# Patient Record
Sex: FEMALE | Race: WHITE | NOT HISPANIC OR LATINO | Employment: UNEMPLOYED | URBAN - METROPOLITAN AREA
[De-identification: names, ages, dates, MRNs, and addresses within clinical notes are randomized per-mention and may not be internally consistent; named-entity substitution may affect disease eponyms.]

---

## 2018-07-09 ENCOUNTER — OFFICE VISIT - HEALTHEAST (OUTPATIENT)
Dept: PODIATRY | Facility: CLINIC | Age: 58
End: 2018-07-09

## 2018-07-09 ENCOUNTER — RECORDS - HEALTHEAST (OUTPATIENT)
Dept: GENERAL RADIOLOGY | Facility: CLINIC | Age: 58
End: 2018-07-09

## 2018-07-09 DIAGNOSIS — M20.41 HAMMER TOES OF BOTH FEET: ICD-10-CM

## 2018-07-09 DIAGNOSIS — M20.42 HAMMER TOES OF BOTH FEET: ICD-10-CM

## 2018-07-09 DIAGNOSIS — M21.6X1 PRONATION DEFORMITY OF BOTH FEET: ICD-10-CM

## 2018-07-09 DIAGNOSIS — M20.42 OTHER HAMMER TOE(S) (ACQUIRED), LEFT FOOT: ICD-10-CM

## 2018-07-09 DIAGNOSIS — M20.41 OTHER HAMMER TOE(S) (ACQUIRED), RIGHT FOOT: ICD-10-CM

## 2018-07-09 DIAGNOSIS — M21.6X2 PRONATION DEFORMITY OF BOTH FEET: ICD-10-CM

## 2018-07-12 ENCOUNTER — OFFICE VISIT - HEALTHEAST (OUTPATIENT)
Dept: INTERNAL MEDICINE | Facility: CLINIC | Age: 58
End: 2018-07-12

## 2018-07-12 DIAGNOSIS — M20.41 HAMMER TOES OF BOTH FEET: ICD-10-CM

## 2018-07-12 DIAGNOSIS — F41.9 ANXIETY: ICD-10-CM

## 2018-07-12 DIAGNOSIS — Z01.818 PREOP GENERAL PHYSICAL EXAM: ICD-10-CM

## 2018-07-12 DIAGNOSIS — M20.42 HAMMER TOES OF BOTH FEET: ICD-10-CM

## 2018-07-12 LAB
ATRIAL RATE - MUSE: 66 BPM
DIASTOLIC BLOOD PRESSURE - MUSE: NORMAL MMHG
HGB BLD-MCNC: 13.1 G/DL (ref 12–16)
INTERPRETATION ECG - MUSE: NORMAL
P AXIS - MUSE: 23 DEGREES
PR INTERVAL - MUSE: 196 MS
QRS DURATION - MUSE: 102 MS
QT - MUSE: 410 MS
QTC - MUSE: 429 MS
R AXIS - MUSE: 0 DEGREES
SYSTOLIC BLOOD PRESSURE - MUSE: NORMAL MMHG
T AXIS - MUSE: 6 DEGREES
VENTRICULAR RATE- MUSE: 66 BPM

## 2018-07-12 ASSESSMENT — MIFFLIN-ST. JEOR: SCORE: 1610.15

## 2018-07-23 ENCOUNTER — ANESTHESIA - HEALTHEAST (OUTPATIENT)
Dept: SURGERY | Facility: AMBULATORY SURGERY CENTER | Age: 58
End: 2018-07-23

## 2018-07-23 ENCOUNTER — COMMUNICATION - HEALTHEAST (OUTPATIENT)
Dept: INTERNAL MEDICINE | Facility: CLINIC | Age: 58
End: 2018-07-23

## 2018-07-23 ASSESSMENT — MIFFLIN-ST. JEOR: SCORE: 1606.52

## 2018-07-24 ENCOUNTER — SURGERY - HEALTHEAST (OUTPATIENT)
Dept: SURGERY | Facility: AMBULATORY SURGERY CENTER | Age: 58
End: 2018-07-24

## 2018-07-24 ASSESSMENT — MIFFLIN-ST. JEOR: SCORE: 1606.52

## 2018-08-01 ENCOUNTER — OFFICE VISIT - HEALTHEAST (OUTPATIENT)
Dept: PODIATRY | Facility: CLINIC | Age: 58
End: 2018-08-01

## 2018-08-01 DIAGNOSIS — M20.42 HAMMER TOES OF BOTH FEET: ICD-10-CM

## 2018-08-01 DIAGNOSIS — M20.41 HAMMER TOES OF BOTH FEET: ICD-10-CM

## 2018-08-08 ENCOUNTER — RECORDS - HEALTHEAST (OUTPATIENT)
Dept: GENERAL RADIOLOGY | Facility: CLINIC | Age: 58
End: 2018-08-08

## 2018-08-08 ENCOUNTER — OFFICE VISIT - HEALTHEAST (OUTPATIENT)
Dept: PODIATRY | Facility: CLINIC | Age: 58
End: 2018-08-08

## 2018-08-08 DIAGNOSIS — M20.42 OTHER HAMMER TOE(S) (ACQUIRED), LEFT FOOT: ICD-10-CM

## 2018-08-08 DIAGNOSIS — M20.42 HAMMER TOES OF BOTH FEET: ICD-10-CM

## 2018-08-08 DIAGNOSIS — M20.41 OTHER HAMMER TOE(S) (ACQUIRED), RIGHT FOOT: ICD-10-CM

## 2018-08-08 DIAGNOSIS — M20.41 HAMMER TOES OF BOTH FEET: ICD-10-CM

## 2018-08-15 ENCOUNTER — OFFICE VISIT - HEALTHEAST (OUTPATIENT)
Dept: PODIATRY | Facility: CLINIC | Age: 58
End: 2018-08-15

## 2018-08-15 ENCOUNTER — AMBULATORY - HEALTHEAST (OUTPATIENT)
Dept: OTHER | Facility: CLINIC | Age: 58
End: 2018-08-15

## 2018-08-15 DIAGNOSIS — M20.41 HAMMER TOES OF BOTH FEET: ICD-10-CM

## 2018-08-15 DIAGNOSIS — M21.6X2 PRONATION DEFORMITY OF BOTH FEET: ICD-10-CM

## 2018-08-15 DIAGNOSIS — M20.42 HAMMER TOES OF BOTH FEET: ICD-10-CM

## 2018-08-15 DIAGNOSIS — M21.6X1 PRONATION DEFORMITY OF BOTH FEET: ICD-10-CM

## 2018-08-29 ENCOUNTER — AMBULATORY - HEALTHEAST (OUTPATIENT)
Dept: OTHER | Facility: CLINIC | Age: 58
End: 2018-08-29

## 2018-12-31 ENCOUNTER — COMMUNICATION - HEALTHEAST (OUTPATIENT)
Dept: ADMINISTRATIVE | Facility: CLINIC | Age: 58
End: 2018-12-31

## 2019-04-05 ENCOUNTER — OFFICE VISIT - HEALTHEAST (OUTPATIENT)
Dept: INTERNAL MEDICINE | Facility: CLINIC | Age: 59
End: 2019-04-05

## 2019-04-05 DIAGNOSIS — J45.990 EXERCISE-INDUCED ASTHMA: ICD-10-CM

## 2019-04-05 DIAGNOSIS — L90.0 LICHEN SCLEROSUS: ICD-10-CM

## 2019-04-05 DIAGNOSIS — Z00.00 ROUTINE GENERAL MEDICAL EXAMINATION AT A HEALTH CARE FACILITY: ICD-10-CM

## 2019-04-05 DIAGNOSIS — N39.3 FEMALE STRESS INCONTINENCE: ICD-10-CM

## 2019-04-05 DIAGNOSIS — B00.9 HSV-1 (HERPES SIMPLEX VIRUS 1) INFECTION: ICD-10-CM

## 2019-04-05 LAB
CHOLEST SERPL-MCNC: 281 MG/DL
FASTING STATUS PATIENT QL REPORTED: YES
FASTING STATUS PATIENT QL REPORTED: YES
GLUCOSE BLD-MCNC: 89 MG/DL (ref 70–125)
HDLC SERPL-MCNC: 58 MG/DL
LDLC SERPL CALC-MCNC: 199 MG/DL
TRIGL SERPL-MCNC: 122 MG/DL

## 2019-04-05 ASSESSMENT — MIFFLIN-ST. JEOR: SCORE: 1422.92

## 2019-04-16 ENCOUNTER — OFFICE VISIT - HEALTHEAST (OUTPATIENT)
Dept: INTERNAL MEDICINE | Facility: CLINIC | Age: 59
End: 2019-04-16

## 2019-04-16 DIAGNOSIS — Z12.31 ENCOUNTER FOR SCREENING MAMMOGRAM FOR BREAST CANCER: ICD-10-CM

## 2019-04-16 DIAGNOSIS — H93.8X1 EAR FULLNESS, RIGHT: ICD-10-CM

## 2019-04-16 DIAGNOSIS — R21 SKIN RASH: ICD-10-CM

## 2019-04-16 ASSESSMENT — MIFFLIN-ST. JEOR: SCORE: 1429.73

## 2019-04-22 ENCOUNTER — HOSPITAL ENCOUNTER (OUTPATIENT)
Dept: MAMMOGRAPHY | Facility: CLINIC | Age: 59
Discharge: HOME | End: 2019-04-22

## 2019-04-22 DIAGNOSIS — Z12.31 ENCOUNTER FOR SCREENING MAMMOGRAM FOR BREAST CANCER: ICD-10-CM

## 2019-04-24 ENCOUNTER — RECORDS - HEALTHEAST (OUTPATIENT)
Dept: ADMINISTRATIVE | Facility: OTHER | Age: 59
End: 2019-04-24

## 2019-05-14 ENCOUNTER — COMMUNICATION - HEALTHEAST (OUTPATIENT)
Dept: NURSING | Facility: CLINIC | Age: 59
End: 2019-05-14

## 2019-06-05 ENCOUNTER — RECORDS - HEALTHEAST (OUTPATIENT)
Dept: ADMINISTRATIVE | Facility: OTHER | Age: 59
End: 2019-06-05

## 2019-07-22 ENCOUNTER — COMMUNICATION - HEALTHEAST (OUTPATIENT)
Dept: INTERNAL MEDICINE | Facility: CLINIC | Age: 59
End: 2019-07-22

## 2019-07-22 DIAGNOSIS — L90.0 LICHEN SCLEROSUS: ICD-10-CM

## 2019-12-06 ENCOUNTER — RECORDS - HEALTHEAST (OUTPATIENT)
Dept: ADMINISTRATIVE | Facility: OTHER | Age: 59
End: 2019-12-06

## 2021-02-07 ENCOUNTER — APPOINTMENT (OUTPATIENT)
Dept: MRI IMAGING | Facility: CLINIC | Age: 61
DRG: 069 | End: 2021-02-07
Attending: INTERNAL MEDICINE
Payer: COMMERCIAL

## 2021-02-07 ENCOUNTER — HOSPITAL ENCOUNTER (INPATIENT)
Facility: CLINIC | Age: 61
LOS: 1 days | Discharge: HOME | DRG: 069 | End: 2021-02-10
Attending: HOSPITALIST | Admitting: HOSPITALIST
Payer: COMMERCIAL

## 2021-02-07 DIAGNOSIS — G45.9 TIA (TRANSIENT ISCHEMIC ATTACK): Primary | ICD-10-CM

## 2021-02-07 LAB
CHOLEST SERPL-MCNC: 300 MG/DL
HBA1C MFR BLD: 5.4 % (ref 0–5.6)
HDLC SERPL-MCNC: 54 MG/DL
LDLC SERPL CALC-MCNC: ABNORMAL MG/DL
LDLC SERPL DIRECT ASSAY-MCNC: 188 MG/DL
NONHDLC SERPL-MCNC: 246 MG/DL
TRIGL SERPL-MCNC: 423 MG/DL
TROPONIN I SERPL-MCNC: <0.015 UG/L (ref 0–0.04)

## 2021-02-07 PROCEDURE — 99220 PR INITIAL OBSERVATION CARE,LEVEL III: CPT | Mod: AI | Performed by: INTERNAL MEDICINE

## 2021-02-07 PROCEDURE — 80061 LIPID PANEL: CPT | Performed by: INTERNAL MEDICINE

## 2021-02-07 PROCEDURE — A9585 GADOBUTROL INJECTION: HCPCS | Performed by: HOSPITALIST

## 2021-02-07 PROCEDURE — 70553 MRI BRAIN STEM W/O & W/DYE: CPT

## 2021-02-07 PROCEDURE — 83721 ASSAY OF BLOOD LIPOPROTEIN: CPT | Performed by: INTERNAL MEDICINE

## 2021-02-07 PROCEDURE — 99207 PR CDG-CODE CATEGORY CHANGED: CPT | Performed by: INTERNAL MEDICINE

## 2021-02-07 PROCEDURE — 255N000002 HC RX 255 OP 636: Performed by: HOSPITALIST

## 2021-02-07 PROCEDURE — 250N000013 HC RX MED GY IP 250 OP 250 PS 637: Performed by: INTERNAL MEDICINE

## 2021-02-07 PROCEDURE — G0378 HOSPITAL OBSERVATION PER HR: HCPCS

## 2021-02-07 PROCEDURE — 84484 ASSAY OF TROPONIN QUANT: CPT | Performed by: INTERNAL MEDICINE

## 2021-02-07 PROCEDURE — 36415 COLL VENOUS BLD VENIPUNCTURE: CPT | Performed by: INTERNAL MEDICINE

## 2021-02-07 PROCEDURE — 83036 HEMOGLOBIN GLYCOSYLATED A1C: CPT | Performed by: INTERNAL MEDICINE

## 2021-02-07 RX ORDER — ONDANSETRON 2 MG/ML
4 INJECTION INTRAMUSCULAR; INTRAVENOUS EVERY 6 HOURS PRN
Status: DISCONTINUED | OUTPATIENT
Start: 2021-02-07 | End: 2021-02-10 | Stop reason: HOSPADM

## 2021-02-07 RX ORDER — ONDANSETRON 4 MG/1
4 TABLET, ORALLY DISINTEGRATING ORAL EVERY 6 HOURS PRN
Status: DISCONTINUED | OUTPATIENT
Start: 2021-02-07 | End: 2021-02-10 | Stop reason: HOSPADM

## 2021-02-07 RX ORDER — LABETALOL HYDROCHLORIDE 5 MG/ML
10-40 INJECTION, SOLUTION INTRAVENOUS EVERY 10 MIN PRN
Status: DISCONTINUED | OUTPATIENT
Start: 2021-02-07 | End: 2021-02-10 | Stop reason: HOSPADM

## 2021-02-07 RX ORDER — TRIAMCINOLONE ACETONIDE 1 MG/G
CREAM TOPICAL 2 TIMES DAILY PRN
Status: ON HOLD | COMMUNITY
End: 2021-07-14

## 2021-02-07 RX ORDER — AMOXICILLIN 250 MG
1 CAPSULE ORAL 2 TIMES DAILY PRN
Status: DISCONTINUED | OUTPATIENT
Start: 2021-02-07 | End: 2021-02-10 | Stop reason: HOSPADM

## 2021-02-07 RX ORDER — ALBUTEROL SULFATE 90 UG/1
2 AEROSOL, METERED RESPIRATORY (INHALATION) EVERY 6 HOURS PRN
Status: ON HOLD | COMMUNITY
End: 2021-07-14

## 2021-02-07 RX ORDER — LACTOBACILLUS ACIDOPHILUS 500MM CELL
3 CAPSULE ORAL AT BEDTIME
COMMUNITY

## 2021-02-07 RX ORDER — ATORVASTATIN CALCIUM 40 MG/1
40 TABLET, FILM COATED ORAL
Status: DISCONTINUED | OUTPATIENT
Start: 2021-02-07 | End: 2021-02-08

## 2021-02-07 RX ORDER — AMOXICILLIN 250 MG
2 CAPSULE ORAL 2 TIMES DAILY PRN
Status: DISCONTINUED | OUTPATIENT
Start: 2021-02-07 | End: 2021-02-10 | Stop reason: HOSPADM

## 2021-02-07 RX ORDER — POLYETHYLENE GLYCOL 3350 17 G/17G
17 POWDER, FOR SOLUTION ORAL DAILY PRN
Status: DISCONTINUED | OUTPATIENT
Start: 2021-02-07 | End: 2021-02-10 | Stop reason: HOSPADM

## 2021-02-07 RX ORDER — CLOBETASOL PROPIONATE 0.5 MG/G
CREAM TOPICAL 2 TIMES DAILY PRN
COMMUNITY

## 2021-02-07 RX ORDER — BISACODYL 10 MG
10 SUPPOSITORY, RECTAL RECTAL DAILY PRN
Status: DISCONTINUED | OUTPATIENT
Start: 2021-02-07 | End: 2021-02-10 | Stop reason: HOSPADM

## 2021-02-07 RX ORDER — ACETAMINOPHEN 650 MG/1
650 SUPPOSITORY RECTAL EVERY 4 HOURS PRN
Status: DISCONTINUED | OUTPATIENT
Start: 2021-02-07 | End: 2021-02-10 | Stop reason: HOSPADM

## 2021-02-07 RX ORDER — GADOBUTROL 604.72 MG/ML
10 INJECTION INTRAVENOUS ONCE
Status: DISCONTINUED | OUTPATIENT
Start: 2021-02-07 | End: 2021-02-07

## 2021-02-07 RX ORDER — MULTIVIT WITH MINERALS/LUTEIN
250 TABLET ORAL DAILY
COMMUNITY

## 2021-02-07 RX ORDER — ACETAMINOPHEN 325 MG/1
650 TABLET ORAL EVERY 4 HOURS PRN
Status: DISCONTINUED | OUTPATIENT
Start: 2021-02-07 | End: 2021-02-10 | Stop reason: HOSPADM

## 2021-02-07 RX ORDER — HYDRALAZINE HYDROCHLORIDE 20 MG/ML
10-20 INJECTION INTRAMUSCULAR; INTRAVENOUS
Status: DISCONTINUED | OUTPATIENT
Start: 2021-02-07 | End: 2021-02-10 | Stop reason: HOSPADM

## 2021-02-07 RX ADMIN — GADOBUTROL 10 ML: 604.72 INJECTION INTRAVENOUS at 21:35

## 2021-02-07 RX ADMIN — ACETAMINOPHEN 650 MG: 325 TABLET, FILM COATED ORAL at 22:27

## 2021-02-07 ASSESSMENT — COLUMBIA-SUICIDE SEVERITY RATING SCALE - C-SSRS
1. IN THE PAST MONTH, HAVE YOU WISHED YOU WERE DEAD OR WISHED YOU COULD GO TO SLEEP AND NOT WAKE UP?: NO
6. HAVE YOU EVER DONE ANYTHING, STARTED TO DO ANYTHING, OR PREPARED TO DO ANYTHING TO END YOUR LIFE?: NO
2. HAVE YOU ACTUALLY HAD ANY THOUGHTS OF KILLING YOURSELF IN THE PAST MONTH?: NO
2. HAVE YOU ACTUALLY HAD ANY THOUGHTS OF KILLING YOURSELF SINCE LAST CONTACT?: NO

## 2021-02-08 ENCOUNTER — COMMUNICATION - HEALTHEAST (OUTPATIENT)
Dept: SCHEDULING | Facility: CLINIC | Age: 61
End: 2021-02-08

## 2021-02-08 ENCOUNTER — APPOINTMENT (OUTPATIENT)
Dept: MRI IMAGING | Facility: CLINIC | Age: 61
DRG: 069 | End: 2021-02-08
Attending: PSYCHIATRY & NEUROLOGY
Payer: COMMERCIAL

## 2021-02-08 ENCOUNTER — APPOINTMENT (OUTPATIENT)
Dept: CARDIOLOGY | Facility: CLINIC | Age: 61
DRG: 069 | End: 2021-02-08
Attending: INTERNAL MEDICINE
Payer: COMMERCIAL

## 2021-02-08 ENCOUNTER — APPOINTMENT (OUTPATIENT)
Dept: MRI IMAGING | Facility: CLINIC | Age: 61
DRG: 069 | End: 2021-02-08
Attending: STUDENT IN AN ORGANIZED HEALTH CARE EDUCATION/TRAINING PROGRAM
Payer: COMMERCIAL

## 2021-02-08 ENCOUNTER — APPOINTMENT (OUTPATIENT)
Dept: OCCUPATIONAL THERAPY | Facility: CLINIC | Age: 61
DRG: 069 | End: 2021-02-08
Attending: INTERNAL MEDICINE
Payer: COMMERCIAL

## 2021-02-08 LAB
GLUCOSE BLDC GLUCOMTR-MCNC: 105 MG/DL (ref 70–99)
GLUCOSE BLDC GLUCOMTR-MCNC: 94 MG/DL (ref 70–99)
TROPONIN I SERPL-MCNC: <0.015 UG/L (ref 0–0.04)

## 2021-02-08 PROCEDURE — 36415 COLL VENOUS BLD VENIPUNCTURE: CPT | Performed by: INTERNAL MEDICINE

## 2021-02-08 PROCEDURE — 250N000013 HC RX MED GY IP 250 OP 250 PS 637: Performed by: PHYSICIAN ASSISTANT

## 2021-02-08 PROCEDURE — 84484 ASSAY OF TROPONIN QUANT: CPT | Performed by: INTERNAL MEDICINE

## 2021-02-08 PROCEDURE — 999N001017 HC STATISTIC GLUCOSE BY METER IP

## 2021-02-08 PROCEDURE — 97165 OT EVAL LOW COMPLEX 30 MIN: CPT | Mod: GO

## 2021-02-08 PROCEDURE — 99254 IP/OBS CNSLTJ NEW/EST MOD 60: CPT | Mod: GC | Performed by: PSYCHIATRY & NEUROLOGY

## 2021-02-08 PROCEDURE — G0378 HOSPITAL OBSERVATION PER HR: HCPCS

## 2021-02-08 PROCEDURE — A9585 GADOBUTROL INJECTION: HCPCS | Performed by: HOSPITALIST

## 2021-02-08 PROCEDURE — 93306 TTE W/DOPPLER COMPLETE: CPT | Mod: 26 | Performed by: INTERNAL MEDICINE

## 2021-02-08 PROCEDURE — 250N000013 HC RX MED GY IP 250 OP 250 PS 637: Performed by: HOSPITALIST

## 2021-02-08 PROCEDURE — 70553 MRI BRAIN STEM W/O & W/DYE: CPT

## 2021-02-08 PROCEDURE — 999N000208 ECHOCARDIOGRAM COMPLETE

## 2021-02-08 PROCEDURE — 99226 PR SUBSEQUENT OBSERVATION CARE,LEVEL III: CPT | Performed by: HOSPITALIST

## 2021-02-08 PROCEDURE — 70544 MR ANGIOGRAPHY HEAD W/O DYE: CPT

## 2021-02-08 PROCEDURE — 255N000002 HC RX 255 OP 636: Performed by: HOSPITALIST

## 2021-02-08 PROCEDURE — 250N000013 HC RX MED GY IP 250 OP 250 PS 637: Performed by: INTERNAL MEDICINE

## 2021-02-08 PROCEDURE — 999N000226 HC STATISTIC SLP IP EVAL DEFER

## 2021-02-08 RX ORDER — LISINOPRIL 10 MG/1
10 TABLET ORAL DAILY
Qty: 30 TABLET | Refills: 0 | Status: SHIPPED | OUTPATIENT
Start: 2021-02-08 | End: 2022-04-29

## 2021-02-08 RX ORDER — ASPIRIN 81 MG/1
81 TABLET ORAL DAILY
Status: DISCONTINUED | OUTPATIENT
Start: 2021-02-09 | End: 2021-02-10 | Stop reason: HOSPADM

## 2021-02-08 RX ORDER — EZETIMIBE 10 MG/1
10 TABLET ORAL AT BEDTIME
Status: DISCONTINUED | OUTPATIENT
Start: 2021-02-08 | End: 2021-02-10 | Stop reason: HOSPADM

## 2021-02-08 RX ORDER — LISINOPRIL 10 MG/1
10 TABLET ORAL DAILY
Status: DISCONTINUED | OUTPATIENT
Start: 2021-02-08 | End: 2021-02-10 | Stop reason: HOSPADM

## 2021-02-08 RX ORDER — LISINOPRIL 2.5 MG/1
2.5 TABLET ORAL DAILY
Status: DISCONTINUED | OUTPATIENT
Start: 2021-02-08 | End: 2021-02-08

## 2021-02-08 RX ORDER — CLOPIDOGREL BISULFATE 75 MG/1
75 TABLET ORAL DAILY
Qty: 90 TABLET | Refills: 0 | Status: SHIPPED | OUTPATIENT
Start: 2021-02-08 | End: 2021-07-12

## 2021-02-08 RX ORDER — GADOBUTROL 604.72 MG/ML
10 INJECTION INTRAVENOUS ONCE
Status: COMPLETED | OUTPATIENT
Start: 2021-02-08 | End: 2021-02-08

## 2021-02-08 RX ORDER — EZETIMIBE 10 MG/1
10 TABLET ORAL AT BEDTIME
Qty: 30 TABLET | Refills: 0 | Status: SHIPPED | OUTPATIENT
Start: 2021-02-08 | End: 2021-07-12

## 2021-02-08 RX ORDER — CLOPIDOGREL BISULFATE 75 MG/1
75 TABLET ORAL DAILY
Status: DISCONTINUED | OUTPATIENT
Start: 2021-02-08 | End: 2021-02-09

## 2021-02-08 RX ADMIN — ACETAMINOPHEN 650 MG: 325 TABLET, FILM COATED ORAL at 23:38

## 2021-02-08 RX ADMIN — EZETIMIBE 10 MG: 10 TABLET ORAL at 22:28

## 2021-02-08 RX ADMIN — ACETAMINOPHEN 650 MG: 325 TABLET, FILM COATED ORAL at 19:59

## 2021-02-08 RX ADMIN — ACETAMINOPHEN 650 MG: 325 TABLET, FILM COATED ORAL at 08:09

## 2021-02-08 RX ADMIN — ACETAMINOPHEN 650 MG: 325 TABLET, FILM COATED ORAL at 11:56

## 2021-02-08 RX ADMIN — Medication 1 MG: at 23:38

## 2021-02-08 RX ADMIN — ASPIRIN 325 MG: 325 TABLET, COATED ORAL at 08:09

## 2021-02-08 RX ADMIN — ACETAMINOPHEN 650 MG: 325 TABLET, FILM COATED ORAL at 16:27

## 2021-02-08 RX ADMIN — GADOBUTROL 10 ML: 604.72 INJECTION INTRAVENOUS at 20:58

## 2021-02-08 RX ADMIN — LISINOPRIL 10 MG: 10 TABLET ORAL at 17:38

## 2021-02-08 NOTE — PHARMACY-ADMISSION MEDICATION HISTORY
Pharmacy Medication History  Admission medication history interview status for the 2/7/2021  admission is complete. See EPIC admission navigator for prior to admission medications     Location of Interview: Outside patient room but on unit  Medication history sources: Care Everywhere and [facility] completed PTA med list 2/7 at outside hospital prior to transfer to [facility]     In the past week, patient estimated taking medication greater than 90% of the time.    Medication reconciliation completed by provider prior to medication history? No    Time spent in this activity: 10 min      Prior to Admission medications    Medication Sig Last Dose Taking? Auth Provider   Acidophilus Lactobacillus CAPS Take 3 capsules by mouth At Bedtime Mother Earth Brand 2/6/2021 Yes Unknown, Entered By History   Glucosamine-Chondroitin 250-200 MG TABS Take 2 tablets by mouth daily 2/7/2021 Yes Unknown, Entered By History   Multiple Vitamins-Minerals (AIRBORNE GUMMIES PO) Take 3 chew tab by mouth daily 2/7/2021 Yes Unknown, Entered By History   TURMERIC PO Take 2 tablets by mouth daily 2/7/2021 Yes Unknown, Entered By History   vitamin C (ASCORBIC ACID) 250 MG tablet Take 250 mg by mouth daily 2/7/2021 Yes Unknown, Entered By History   Vitamin D3 (CHOLECALCIFEROL) 125 MCG (5000 UT) tablet Take 125 mcg by mouth daily 2/7/2021 Yes Unknown, Entered By History   albuterol (PROAIR HFA/PROVENTIL HFA/VENTOLIN HFA) 108 (90 Base) MCG/ACT inhaler Inhale 2 puffs into the lungs every 6 hours as needed for wheezing More than a month at Unknown time  Unknown, Entered By History   clobetasol (TEMOVATE) 0.05 % external cream Apply topically 2 times daily as needed More than a month  Unknown, Entered By History   triamcinolone (KENALOG) 0.1 % external cream Apply topically 2 times daily as needed for irritation More than a month  Unknown, Entered By History

## 2021-02-08 NOTE — CONSULTS
Stroke Education Note    The following information has been reviewed with the patient:    1. Warning signs of stroke    2. Calling 911 if having warning signs of stroke    3. All modifiable risk factors: hypertension, CAD, atrial fib, diabetes, hypercholesterolemia, smoking, substance abuse, diet, physical inactivity, obesity, sleep apnea.    4. Patient's risk factors for stroke which include: HLD,obesity, physical inactivity    5. Follow-up plan for after discharge    6. Discharge medications which include: Lipitor, ASA, Plavix    In addition, the [facility] Stroke Class Handout has been given to the patient.    Learner's response to risk factors / lifestyle modification education: Committment to change     Fadumo Hernandez RN

## 2021-02-08 NOTE — PROGRESS NOTES
02/08/21 1005   Quick Adds   Type of Visit Initial Occupational Therapy Evaluation   Living Environment   People in home spouse   Current Living Arrangements house   Living Environment Comments pt has multi-level home, 1 sided railing. Bathroom includes walk-in shower w/ grab bars and a shower chair.    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 Pt ind w/ all ADL's/IADL's w/o A.D.   Disability/Function   Fall history within last six months no   Change in Functional Status Since Onset of Current Illness/Injury no   General Information   Onset of Illness/Injury or Date of Surgery 02/07/21   Referring Physician Jessica Martinez MD   Patient/Family Therapy Goal Statement (OT) return home   Additional Occupational Profile Info/Pertinent History of Current Problem Lissette Wong is a 61 year old female obesity, migraine headaches with aura, recent TBI in March 2019, HLD who presented from [facility] ER after experiencing an episode of aphasia with reported left-sided facial weakness. Her head CT showed focal narrowing of the M2 segment of the MCA and TPA was going to be administered, and then symptoms resolved. She was transferred here for further evaluation.    Cognitive Status Examination   Orientation Status orientation to person, place and time   Affect/Mental Status (Cognitive) WNL   Follows Commands WNL   Visual Perception   Impact of Vision Impairment on Function (Vision) pt wears glasses, denies any visual deficits   Pain Assessment   Patient Currently in Pain No   Range of Motion Comprehensive   Comment, General Range of Motion ROM WFL   Strength Comprehensive (MMT)   Comment, General Manual Muscle Testing (MMT) Assessment slightly decreased R shoulder strength at baseline, otherwise WNL bilaterally.    Coordination   Upper Extremity Coordination No deficits were identified   Bed Mobility    Comment (Bed Mobility) independent   Transfers   Transfer Comments independent   Balance   Balance Assessment no deficits were identified   Activities of Daily Living   BADL Assessment/Intervention   (pt currently ind w/ all ADLs)   Clinical Impression   Criteria for Skilled Therapeutic Interventions Met (OT) no;no problems identified which require skilled intervention;does not meet criteria for skilled intervention   Clinical Decision Making Complexity (OT) low complexity   Risk & Benefits of therapy have been explained evaluation/treatment results reviewed;patient   Comment-Clinical Impression pt ind w/ all ADL's and mobility including negotiating up/down 2 flights of stairs. No deficits identified   OT Discharge Planning    OT Discharge Recommendation (DC Rec) home   OT Rationale for DC Rec pt at functional baseline and is anticipated to safely return home when medically able to d/c.    Total Evaluation Time (Minutes)   Total Evaluation Time (Minutes) 15

## 2021-02-08 NOTE — H&P
[facility]    History and Physical - Hospitalist Service       Date of Admission:  2/7/2021    Assessment & Plan   Lissette Wong is a 61 year old female obesity, migraine headaches with aura, recent TBI in March 2019, HLD who presented from [facility] ER after experiencing an episode of aphasia with reported left-sided facial weakness. Her head CT showed focal narrowing of the M2 segment of the MCA and TPA was going to be administered, and then symptoms resolved. She was transferred here for further evaluation.     At presentation her temperature was 97.9, blood pressure 167/93, pulse 70, respirations 20, oxygenation 97% on room air.    Labs:  Her Covid screen PCR was negative.    INR 0.99, PTT 27  Sodium 138, potassium 3.8, BUN 18 creatinine 0.83, magnesium 1.8  WBC 7.2 hemoglobin 12.9, platelets 286, troponin was negative.  TSH 1.64  Urine analysis was negative.    EKG revealed normal sinus rhythm without changes suggestive of acute ischemia..     CT head showed suggestion of increased hyperdensity within the M2 branch of the left MCA.  CTA head and neck showed short segment high-grade narrowing within the left MCA (Please see report from [facility] for further details).     Suspect acute cerebra vascular event  Acute transient aphasia with left facial weakness   - Received  mg in the outside ED  - Permissive HTN, PRN's ordered.   - Telemetry, echocardiogram  - Check lipids, A1c, trend troponin   - PT/OT/Speech  - Started statin     H/o TBI, subdural hemorrhage, closed fracture of R orbital floor in March 2019 after falling down stairs. Hospitalized at [facility]. Patient notes irritability and decreased concentration since that stay.     Migraines with aura patient states for the last several years she has just had aura without migraines.    Morbid obesity contributes to morbidity associated with above       Diet: NPO for Medical/Clinical Reasons Except for: No  Exceptions  Regular Diet Adult    DVT Prophylaxis: Low Risk/Ambulatory with no VTE prophylaxis indicated  Aguirre Catheter: not present  Code Status: Full Code           Disposition Plan   Expected discharge: Tomorrow, recommended to prior living arrangement once Evaluation is complete.  Entered: Jessica Martinez MD 02/07/2021, 8:45 PM     The patient's care was discussed with the Bedside Nurse and Patient.    Jessica Martinez MD  [facility]  Contact information available via [facility] Paging/Directory      ______________________________________________________________________    Chief Complaint   Difficulty talking    History is obtained from the patient    History of Present Illness   Lissette Wong is a 61 year old female obesity, migraine headaches with aura, recent TBI in March 2019 who presents from [facility] ER after experiencing an episode of aphasia with reported left-sided facial weakness.  Patient was in a Zoom meeting with friends when they noted she was not able to talk.  They told her to go get her .  He noted that she was unable to form any words, and also noted left facial weakness.  She states she remembers touching her face and feeling like it was numb.  When she arrived in the ER there was no facial asymmetry or motor deficits.  She did have significant aphasia was unable to make any words.  But no other neurological deficits were noted.  She underwent CTA which showed high-grade narrowing of the proximal M2 branch of the left middle cerebral artery posterior division in addition to mild to moderate narrowing of the M2 branch of the left middle cerebral artery anteriorly and a focal high-grade narrowing of the distal M3 branch of the left middle cerebral artery.  This was reviewed with the neurologist and initially they were going to give TPA.  But after her imaging her aphasia had completely resolved and she was neurologically intact.  Therefore she was transferred here  for observation admission and further evaluation for stroke. Notably, patient states that while in the emergency department at [facility] she had noted her usual migraine aura.     She denies any acute illness specifically denies any fevers, chills, chest pain, shortness of breath, abdominal pain, nausea.     At presentation her temperature was 97.9, blood pressure 167/93, pulse 70, respirations 20, oxygenation 97% on room air.    Labs:  Her Covid screen PCR was negative.    INR 0.99, PTT 27  Sodium 138, potassium 3.8, BUN 18 creatinine 0.83, magnesium 1.8  WBC 7.2 hemoglobin 12.9, platelets 286, troponin was negative.  TSH 1.64  Urine analysis was negative.    EKG revealed normal sinus rhythm without changes suggestive of acute ischemia..       Review of Systems    The 10 point Review of Systems is negative other than noted in the HPI    Past Medical History    Obesity  Left knee osteoarthritis  Migraine with auras  Mixed hyperlipidemia  Past Surgical History   I have reviewed this patient's surgical history and updated it with pertinent information if needed.  No past surgical history on file.    Social History   I have reviewed this patient's social history and updated it with pertinent information if needed.  Social History     Tobacco Use     Smoking status: Not on file   Substance Use Topics     Alcohol use: Not on file     Drug use: Not on file       Family History     Her father had a major stroke at the age of 65.  He  of prostate cancer    Prior to Admission Medications   Prior to Admission Medications   Prescriptions Last Dose Informant Patient Reported? Taking?   Acidophilus Lactobacillus CAPS 2021  Yes Yes   Sig: Take 3 capsules by mouth At Bedtime Mother Earth Brand   Glucosamine-Chondroitin 250-200 MG TABS 2021  Yes Yes   Sig: Take 2 tablets by mouth daily   Multiple Vitamins-Minerals (AIRBORNE GUMMIES PO) 2021  Yes Yes   Sig: Take 3 chew tab by mouth daily   TURMERIC PO 2021  Yes  Yes   Sig: Take 2 tablets by mouth daily   Vitamin D3 (CHOLECALCIFEROL) 125 MCG (5000 UT) tablet 2/7/2021  Yes Yes   Sig: Take 125 mcg by mouth daily   albuterol (PROAIR HFA/PROVENTIL HFA/VENTOLIN HFA) 108 (90 Base) MCG/ACT inhaler More than a month at Unknown time  Yes No   Sig: Inhale 2 puffs into the lungs every 6 hours as needed for wheezing   clobetasol (TEMOVATE) 0.05 % external cream More than a month  Yes No   Sig: Apply topically 2 times daily as needed   triamcinolone (KENALOG) 0.1 % external cream More than a month  Yes No   Sig: Apply topically 2 times daily as needed for irritation   vitamin C (ASCORBIC ACID) 250 MG tablet 2/7/2021  Yes Yes   Sig: Take 250 mg by mouth daily      Facility-Administered Medications: None     Allergies   Allergies   Allergen Reactions     Morphine Anaphylaxis     Hives, throat swells and can't breath.       Physical Exam   Vital Signs: Temp: 98.5  F (36.9  C) Temp src: Oral BP: (!) 181/102 Pulse: 68   Resp: 18 SpO2: 96 % O2 Device: None (Room air)    Weight: 0 lbs 0 oz    Constitutional:   Awake, alert, cooperative, no apparent distress, and appears stated age     Eyes:   Lids and lashes normal, extra ocular muscles intact, sclera clear, conjunctiva normal     ENT:   Normocephalic, without obvious abnormality, atramatic     Neck:   Supple, symmetrical, trachea midline, no adenopathy, thyroid symmetric, not enlarged and no tenderness, skin normal     Lungs:   No increased work of breathing, good air exchange, clear to auscultation bilaterally, no crackles or wheezing     Cardiovascular:   Regular rate and rhythm, normal S1 and S2, no S3 or S4, and no murmur noted. Extremities are warm. Trace edema.      Abdomen:   Normal bowel sounds, soft, non-distended, non-tender, no masses palpated, no hepatosplenomegally     Musculoskeletal:   There is no redness, warmth, or swelling of the joints. Central obesity.      Neurologic:    Awake, alert, oriented to name, place and time.     Speech intact. Follows commands  Horizontal gaze normal. PERRL  Visual fields intact.  Face symmetric without palsy  Motor intact throughout all 4 extremities  FNF intact bilaterally  Sensation intact throughout. No neglect.      Neuropsychiatric:   General: normal, calm and normal eye contact     Skin:   No excessive bruising. No bleeding, redness, warmth, or swelling and no rashes         Data   Data reviewed today: I reviewed all medications, new labs and imaging results over the last 24 hours. I personally reviewed the EKG tracing showing NSR without acute ischemic changes. .    Recent Labs   Lab 02/07/21 2031   TROPI <0.015     No results found for this or any previous visit (from the past 24 hour(s)).

## 2021-02-08 NOTE — PLAN OF CARE
Transfer from [facility]. Pt was on Zoom call with friend today and developed R facial droop and aphasia. Head CT at [facility] showed focal narrowing of M2 segment of the Left MCA. Symptoms have since resolved. A&Ox4. Neuros intact, NIH 0. -180s, PRNs ordered for SBP>220, otherwise VSS on RA. Chronic right knee pain, tenderness, and swelling; PRN tylenol given x1. Tele NSR. Bedside swallow eval normal, advanced to regular diet. Up SBA, baseline limp from right knee. Neuro consulted. Skin WDL. Brain MRI completed, results pending.

## 2021-02-08 NOTE — PLAN OF CARE
PT: Orders received, chart reviewed. Per chart and OT pt is at functional baseline ambulating without an assistive device with SBA; able to complete stairs. Skilled PT services not needed at this time. Will complete order.

## 2021-02-08 NOTE — PLAN OF CARE
Pt here with TIA vs. Complex migraine. A+Ox4, neuros intact. VSS on RA, tele NSR. Prn Tylenol effective for mild headache. Steady gait, up independently. Baseline R knee pain, swelling. Stroke education scheduled for 1500. Possible discharge later today.

## 2021-02-08 NOTE — PLAN OF CARE
A/Ox4, anxious, apprehensive with cares at times. VSS ex -180s with PRN available for SBP>220. Neuros intact.  CMS intact. Denies pain. Tele SR. Voiding adequately. Ambulated unit x1, SBA, pt has R knee pain and walks with a limp at baseline. BG 94, 105. Plan for ECHO today. Discharge home pending for today.

## 2021-02-08 NOTE — PROGRESS NOTES
[facility]    Hospitalist Progress Note      Assessment & Plan   Lissette Wong is a 61 year old female who was admitted on 2/7/2021 as transfer from [facility] after experiencing an episode of aphasia with reported left-sided facial weakness. Her head CT showed focal narrowing of the M2 segment of the MCA and TPA was going to be administered, and then symptoms resolved    TIA with transient aphasia and left facial weakness, resolved  Hyperlipidemia  Hypertension  Intracranial stenosis (left MCA)  CT head showed suggestion of increased hyperdensity within the M2 branch of the left MCA. CTA head and neck showed short segment high-grade narrowing within the left MCA. MRI Brain: linear hyperintensity along lateral left frontal lobe sulci--consistent with M4 branch occlusion on CTA, age related changes, chronic lacunar infarcts.  *Echo 2/8: EF 60-65%, RV normal. Bubble study negative  - family has not tolerated statins in the past with adverse effects--she declines to trial statin  - Started on zetia once daily, 10mg--discuss repatha as outpatient with PCP. She is willing to try zetia, but is nervous about side effects (given 1 mo prescription)  - DAPT with ASA 81mg and plavix 75mg daily for 3 MONTHS and then aspirin 325mg daily thereafter (given prescriptions for 3 months of initial med)  **Will adjust this medication pending neuro final recs--already filled with pharmacy, will not start plavix inpatient at this time  - Goal blood pressure <130/80, near 160/80 in hospital. Sister has tolerated lisinopril in the past, started on 10mg lisinopril once daily with recommendations to check BP twice daily and report log to PCP for further titration   ---Neuro will connect patient with research coordinator to discuss mGlide   --- recommend BMP in 2 weeks after initiation of lisinopril  - outpatient cardiac monitor for 30 days  - Follow up with PCP in 1-2 weeks  - Follow up with neurology in 6-8  weeks  **Neuro added MRA head with vessel imaging to reevaluate multifocal intracranial stenoses seen on OSH imaging  **ESR/CRP     H/o TBI, subdural hemorrhage, closed fracture of R orbital floor in March 2019 after falling down stairs. Hospitalized at [facility]. Patient notes irritability and decreased concentration since that stay.      Migraines with aura patient states for the last several years she has just had aura without migraines.  - Noted     Morbid obesity contributes to morbidity associated with above    DVT Prophylaxis: Pneumatic Compression Devices  Code Status: Full Code  Expected discharge: Tomorrow, recommended to prior living arrangement once MRA Brain completed    Nathalie Austin, DO  Text Page (7am - 6pm)    Interval History   Patient seen and examined. Initial plans for discharge, reviewed initial plan with patient and set up medications. Later found that additional work-up required prior to discharge. Otherwise, she felt good. No issues with speech.   Spent >35 minutes with patient, discussing care plan with patient and neurology.    -Data reviewed today: I reviewed all new labs and imaging results over the last 24 hours. I personally reviewed no images or EKG's today.    Physical Exam   Temp: 98.2  F (36.8  C) Temp src: Oral BP: (!) 174/104 Pulse: 70   Resp: 16 SpO2: 96 % O2 Device: None (Room air)    There were no vitals filed for this visit.  Vital Signs with Ranges  Temp:  [97.7  F (36.5  C)-99.9  F (37.7  C)] 98.2  F (36.8  C)  Pulse:  [62-73] 70  Resp:  [16-18] 16  BP: (162-184)/() 174/104  SpO2:  [94 %-97 %] 96 %  I/O last 3 completed shifts:  In: 240 [P.O.:240]  Out: -     Constitutional: Awake, alert, cooperative, no apparent distress  Respiratory: Clear to auscultation bilaterally, no crackles or wheezing  Cardiovascular: Regular rate and rhythm, normal S1 and S2, and no murmur noted  GI: Normal bowel sounds, soft, non-distended, non-tender  Skin/Integumen: No rashes, no cyanosis,  no edema  Other:     Medications     - MEDICATION INSTRUCTIONS -         [START ON 2/9/2021] aspirin  81 mg Oral Daily     [Held by provider] clopidogrel  75 mg Oral Daily     ezetimibe  10 mg Oral At Bedtime     lisinopril  10 mg Oral Daily       Data   Recent Labs   Lab 02/08/21 0428 02/07/21 2031   TROPI <0.015 <0.015       Recent Results (from the past 24 hour(s))   MRI Brain w & w/o contrast    Narrative    EXAM: MR BRAIN W/O AND W CONTRAST  LOCATION: [facility]  DATE/TIME: 2/7/2021 9:31 PM    INDICATION: Transient ischemic attack (TIA).  COMPARISON: CTA head and neck dated 02/07/2021.  CONTRAST: 10ml Gadavist.  TECHNIQUE: Routine multiplanar multisequence head MRI without and with intravenous contrast.    FINDINGS:  INTRACRANIAL CONTENTS: There is linear diffusion hyperintensity within one of the sulci along the lateral left frontal lobe, with associated susceptibility and FLAIR hyperintensity. This appears to correlate with an M4 branch occlusion on the CTA (series   14, image 48 of the CTA of the head). No definite parenchymal diffusion restriction although a small cortical component cannot be entirely excluded. Chronic lacunar infarcts in the bilateral basal ganglia. Small chronic infarct in the left cerebellum.   No acute intracranial hemorrhage or adverse intracranial mass effect. Patchy nonspecific T2/FLAIR hyperintensities within the cerebral white matter and ashley most consistent with mild to moderate chronic microvascular ischemic change. Mild generalized   cerebral atrophy. No hydrocephalus. Normal position of the cerebellar tonsils. No pathologic contrast enhancement.    SELLA: No abnormality accounting for technique.    OSSEOUS STRUCTURES/SOFT TISSUES: Focal enhancement along the left frontal calvarium appears to correspond with a small intraosseous hemangioma. The proximal flow voids appear preserved.     ORBITS: No abnormality accounting for technique.     SINUSES/MASTOIDS: Mucosal  thickening primarily involving the ethmoid air cells. No middle ear or mastoid effusion.       Impression    IMPRESSION:  1.  Linear hyperintensity within one of the sulci along the lateral left frontal lobe with associated FLAIR hyperintensity and susceptibility. This appears to correspond with a segmental M4 branch occlusion on the prior CTA. No definite parenchymal   diffusion restriction, however, a subtle cortical infarct cannot be entirely excluded.  2.  Age-related changes, with mild generalized parenchymal volume loss and mild to moderate sequelae of chronic microvascular ischemic disease. Chronic lacunar infarcts in the bilateral basal ganglia and left cerebellum.       Echocardiogram Complete - Bubble study    Narrative    [id number]  [id number]  [id number]^SUSAN^LYDIA^JEANA           [facility]  Echocardiography Laboratory  [facility] [place] [address]        Name: DAWSON RODRIGUEZ  MRN: [id number]  : 1960  Study Date: 2021 10:49 AM  Age: 61 yrs  Gender: Female  Patient Location: [facility]  Reason For Study: CVI  Ordering Physician: LYDIA DAVIS  Referring Physician: DANIEL RODRIGUEZ  Performed By: Naomi Velasco     BSA: 2.0 m2  Height: 63 in  Weight: 225 lb  HR: 62  _____________________________________________________________________________  __        Procedure  Complete Portable Bubble Echo Adult.  _____________________________________________________________________________  __        Interpretation Summary     The visual ejection fraction is estimated at 60-65%.  The right ventricle is normal in structure, function and size.  A contrast injection (Bubble Study) was performed that was negative for flow  across the interatrial septum.  The ascending aorta is Mildly dilated.  There is no comparison study available.  _____________________________________________________________________________  __        Left Ventricle  The left ventricle is normal in  structure, function and size. There is normal  left ventricular wall thickness. Left ventricular systolic function is normal.  The visual ejection fraction is estimated at 60-65%. Left ventricular  diastolic function is normal. No regional wall motion abnormalities noted.     Right Ventricle  The right ventricle is normal in structure, function and size.     Atria  Normal left atrial size. Right atrial size is normal. A contrast injection  (Bubble Study) was performed that was negative for flow across the interatrial  septum.     Mitral Valve  There is mild mitral annular calcification. There is no mitral regurgitation  noted.        Tricuspid Valve  The tricuspid valve is normal in structure and function. There is trace  tricuspid regurgitation.     Aortic Valve  There is mild trileaflet aortic sclerosis. No aortic regurgitation is present.     Pulmonic Valve  The pulmonic valve is not well visualized. There is trace pulmonic valvular  regurgitation.     Vessels  Borderline aortic root dilatation. The ascending aorta is Mildly dilated. The  inferior vena cava is normal.     Pericardium  There is no pericardial effusion.        Rhythm  Sinus rhythm was noted.  _____________________________________________________________________________  __  MMode/2D Measurements & Calculations     IVSd: 1.1 cm  LVIDd: 4.2 cm  LVIDs: 2.9 cm  LVPWd: 1.2 cm  FS: 31.0 %  LV mass(C)d: 166.4 grams  LV mass(C)dI: 81.9 grams/m2  Ao root diam: 3.6 cm  LA dimension: 4.1 cm  asc Aorta Diam: 3.8 cm  LA/Ao: 1.1  LA Volume (BP): 74.0 ml  LA Volume Index (BP): 36.5 ml/m2  RWT: 0.56           Doppler Measurements & Calculations  MV E max reginald: 93.1 cm/sec  MV A max reginald: 93.6 cm/sec  MV E/A: 0.99  MV dec slope: 439.0 cm/sec2  PA acc time: 0.12 sec  E/E' av.1  Lateral E/e': 8.3  Medial E/e': 15.9           _____________________________________________________________________________  __           Report approved by: Tyrone Stiven, Heather  02/08/2021 11:45 AM

## 2021-02-08 NOTE — CONSULTS
"[facility]    Stroke Consult Note    Reason for Consult: \"CVA\"     Chief Complaint: No chief complaint on file.     HPI  Lissette Wong is a right hand dominant 61 year old female with PMH of  migraine headaches with aura, recent TBI in March 2019, HLD, who initially presented to [facility] ED for evaluation for left-sided facial droop and expressive aphasia. Patient reports she was on a zoom call when another participant noticed the patient was not feeling, the participant who was a friend called the patient and spoke with her  who confirmed the facial droop and aphasia. In the ED CTA imaging suggested this patient may be a candidiate for tPA, but when she returned from imaging, all symptoms have resolved. She did note after symptoms resolved, she felt a typical aura. She reported getting tylenol and laying in a dark room which she states helped. This morning patient reports HA which she states may be leading to migraine. She states she has not slept well due to the noise level, which she says may have contributed to the headache. She denies language deficits, current focal weakness or numbness beyond right facial numbness that is secondary to her head injury in 2019.      Stroke Evaluation summarized:  MRI/Head CT: Linear hyperintensity within one of the sulci along the lateral left frontal lobe with associated FLAIR hyperintensity and susceptibility. This appears to correspond with a segmental M4 branch occlusion on the prior CTA. No definite parenchymal diffusion restriction, however, a subtle cortical infarct cannot be entirely excluded.  Intracranial Vascular Imaging: (outside images) Short segment high-grade narrowing of proximal M2 branch of the left middle cerebral artery posterior division  suggestion of mild to moderate narrowing of M2 branch of the left middle cerebral artery slightly anteriorly. Focal high-grade narrowing of distal M3 branch of the left middle cerebral " "artery anterior branch.  Cervical Carotid and Vertebral Artery Vascular Imaging: CTA neck unrevealing   Echocardiogram: EF 60-65%, normal left and right atrial size, negative bubble study   EKG/Telemetry: (outside hospital) SR   LDL: 188  A1c: 5.4  Troponin: <0.015  Other testing: Not Applicable    Impression  TIA vs Complex migraine     Recommendations  -DAPT with ASA 81 mg + Plavix 75 mg for 90 days, then  mg alone indefinitely   -LDL(188) with goal LDL 40-70 ; patient states + family history with severe adverse effects with statin medications, patient declined a statin trial. Start Zetia 10 mg daily. Follow up with PCP to discuss Repatha as an alternative to statin  -A1c (5.4) within goal <7.0  -Goal BP <130/80 with tighter control associated with decreased overall CV risk, if tolerated. Patient interested in mGlide, will send information to research coordinator   -Discharge with 30 day cardiac event monitor to evaluate for atrial fibrillation  -therapies as needed     Patient Follow-up    - in the next 1-2 week(s) with PCP   -Follow-up in Stroke Clinic (located at [facility], [phone]) with Dr. Abdullahi in 6-8 weeks. Referral placed in discharge orders section (note: the order states \"neurosurgery\")    Thank you for this consult. No further stroke evaluation is recommended, so we will sign off. Please contact us with any additional questions.    Kristie Ambrose PA-C   Neurology  To page me or covering stroke neurology team member, click here: AMCOM   Choose \"On Call\" tab at top, then search dropdown box for \"Neurology Adult\", select location, press Enter, then look for stroke/neuro ICU/telestroke.  _____________________________________________________    Past Medical History   No past medical history on file.  Past Surgical History   No past surgical history on file.  Medications   Home Meds  Prior to Admission medications    Medication Sig Start Date End Date Taking? " Authorizing Provider   Acidophilus Lactobacillus CAPS Take 3 capsules by mouth At Bedtime Mother [employer] Brand   Yes Unknown, Entered By History   Glucosamine-Chondroitin 250-200 MG TABS Take 2 tablets by mouth daily   Yes Unknown, Entered By History   Multiple Vitamins-Minerals (AIRBORNE GUMMIES PO) Take 3 chew tab by mouth daily   Yes Unknown, Entered By History   TURMERIC PO Take 2 tablets by mouth daily   Yes Unknown, Entered By History   vitamin C (ASCORBIC ACID) 250 MG tablet Take 250 mg by mouth daily   Yes Unknown, Entered By History   Vitamin D3 (CHOLECALCIFEROL) 125 MCG (5000 UT) tablet Take 125 mcg by mouth daily   Yes Unknown, Entered By History   albuterol (PROAIR HFA/PROVENTIL HFA/VENTOLIN HFA) 108 (90 Base) MCG/ACT inhaler Inhale 2 puffs into the lungs every 6 hours as needed for wheezing    Unknown, Entered By History   clobetasol (TEMOVATE) 0.05 % external cream Apply topically 2 times daily as needed    Unknown, Entered By History   triamcinolone (KENALOG) 0.1 % external cream Apply topically 2 times daily as needed for irritation    Unknown, Entered By History       Scheduled Meds    aspirin  325 mg Oral Daily     atorvastatin  40 mg Oral or NG Tube Daily at 8 pm       Infusion Meds    - MEDICATION INSTRUCTIONS -         PRN Meds  acetaminophen, acetaminophen, bisacodyl, hydrALAZINE, labetalol, - MEDICATION INSTRUCTIONS -, melatonin, ondansetron **OR** ondansetron, polyethylene glycol, senna-docusate **OR** senna-docusate    Allergies   Allergies   Allergen Reactions     Morphine Anaphylaxis     Hives, throat swells and can't breath.     Family History   No family history on file.  Social History   Social History     Tobacco Use     Smoking status: Not on file   Substance Use Topics     Alcohol use: Not on file     Drug use: Not on file       Review of Systems   The 10 point Review of Systems is negative other than noted in the HPI or here.        PHYSICAL EXAMINATION   Temp:  [97.8  F (36.6   C)-98.6  F (37  C)] 98.6  F (37  C)  Pulse:  [66-73] 66  Resp:  [18] 18  BP: (167-184)/() 184/106  SpO2:  [94 %-96 %] 94 %    Neurologic  Mental Status:  alert, oriented x 3, follows commands, speech clear and fluent, naming and repetition normal  Cranial Nerves:  visual fields intact, PERRL, EOMI with normal smooth pursuit, facial sensation intact and symmetric, facial movements symmetric, hearing not formally tested but intact to conversation, palate elevation symmetric and uvula midline, no dysarthria, shoulder shrug strong bilaterally, tongue protrusion midline  Motor:  normal muscle tone and bulk, no abnormal movements, able to move all limbs spontaneously, strength 5/5 throughout upper and lower extremities, no pronator drift  Reflexes:  deferred  Sensory:  light touch sensation intact and symmetric throughout upper and lower extremities, no extinction on double simultaneous stimulation   Coordination:  normal finger-to-nose and heel-to-shin bilaterally without dysmetria  Station/Gait:  deferred    Dysphagia Screen  Per Nursing    Stroke Scales    NIHSS  Interval baseline (02/07/21 2033)   Interval Comments     1a. Level of Consciousness 0-->Alert, keenly responsive   1b. LOC Questions 0-->Answers both questions correctly   1c. LOC Commands 0-->Performs both tasks correctly   2.   Best Gaze 0-->Normal   3.   Visual 0-->No visual loss   4.   Facial Palsy 0-->Normal symmetrical movements   5a. Motor Arm, Left 0-->No drift, limb holds 90 (or 45) degrees for full 10 secs   5b. Motor Arm, Right 0-->No drift, limb holds 90 (or 45) degrees for full 10 secs   6a. Motor Leg, Left 0-->No drift, leg holds 30 degree position for full 5 secs   6b. Motor Leg, right 0-->No drift, leg holds 30 degree position for full 5 secs   7.   Limb Ataxia 0-->Absent   8.   Sensory 0-->Normal, no sensory loss   9.   Best Language 0-->No aphasia, normal   10. Dysarthria 0-->Normal   11. Extinction and Inattention  0-->No abnormality    Total 0 (02/08/21 0800)       Imaging  I personally reviewed all imaging; relevant findings per HPI.    Labs Data   CBC  No results for input(s): WBC, RBC, HGB, HCT, PLT in the last 168 hours.  Basic Metabolic Panel   No results for input(s): NA, POTASSIUM, CHLORIDE, CO2, BUN, CR, GLC, IFEANYI in the last 168 hours.  Liver Panel  No results for input(s): PROTTOTAL, ALBUMIN, BILITOTAL, ALKPHOS, AST, ALT, BILIDIRECT in the last 168 hours.  INR  No lab results found.   Lipid Profile    Recent Labs   Lab Test 02/07/21 2031   CHOL 300*   HDL 54   LDL Cannot estimate LDL when triglyceride exceeds 400 mg/dL  188*   TRIG 423*     A1C    Recent Labs   Lab Test 02/07/21 2031   A1C 5.4     Troponin I    Recent Labs   Lab 02/08/21  0428 02/07/21 2031   TROPI <0.015 <0.015          Stroke Code / Stroke Consult Data Data This was a non-emergent, non-tele stroke consult.

## 2021-02-08 NOTE — PLAN OF CARE
SLP: Orders received, chart reviewed. Per chart and conversations with RN and pt, aphasia/facial weakness symptoms only persisted for ~ 10 minutes and have completely resolved. Pt passed dysphagia screen and is tolerating a regular diet (just finished entire breakfast meal). Pt aware of SLP services from hx of TBI and her nephew is a medical SLP - she politely declines need for communication or swallow evaluation at this time. She is aware that SLP can be re-consulted as IP vs OP if concerns arise. Will complete orders.

## 2021-02-08 NOTE — DISCHARGE SUMMARY
[facility]    Discharge Summary  Hospitalist    Date of Admission:  2/7/2021  Date of Discharge:  2/8/2021  Discharging Provider: Nathalie Austin DO  Date of Service (when I saw the patient): 02/08/21    Discharge Diagnoses   TIA with transient aphasia and left facial weakness, resolved  Hyperlipidemia  Hypertension  H/o TBI, subdural hemorrhage, closed fracture of R orbital floor   Migraines with aura   Morbid obesity    History of Present Illness   Lissette Wong is an 61 year old female who presented with an episode of aphasia with reported left-sided facial weakness. Her head CT showed focal narrowing of the M2 segment of the MCA and TPA was going to be administered, and then symptoms resolved.    Hospital Course   Lissette Wong was admitted on 2/7/2021.  The following problems were addressed during her hospitalization:    TIA with transient aphasia and left facial weakness, resolved  Hyperlipidemia  Hypertension  CT head showed suggestion of increased hyperdensity within the M2 branch of the left MCA. CTA head and neck showed short segment high-grade narrowing within the left MCA.  *Echo 2/8: EF 60-65%, RV normal. Bubble study negative  - family has not tolerated statins in the past with adverse effects--she declines to trial statin  - Started on zetia once daily, 10mg--discuss repatha as outpatient with PCP. She is willing to try zetia, but is nervous about side effects (given 1 mo prescription)  - DAPT with ASA 81mg and plavix 75mg daily for 3 MONTHS and then aspirin 325mg daily thereafter (given prescriptions for 3 months of initial med)  - Goal blood pressure <130/80, near 160/80 in hospital. Sister has tolerated lisinopril in the past, started on 10mg lisinopril once daily with recommendations to check BP twice daily and report log to PCP for further titration   ---Neuro will connect patient with research coordinator to discuss mGlide   --- recommend BMP in 2 weeks after initiation  of lisinopril  - outpatient cardiac monitor for 30 days  - Follow up with PCP in 1-2 weeks  - Follow up with neurology in 6-8 weeks     H/o TBI, subdural hemorrhage, closed fracture of R orbital floor in March 2019 after falling down stairs. Hospitalized at [facility]. Patient notes irritability and decreased concentration since that stay.      Migraines with aura patient states for the last several years she has just had aura without migraines.  - Noted     Morbid obesity contributes to morbidity associated with above    Nathalie Austin,     Significant Results and Procedures   See above    Pending Results   NA    Code Status   Full Code       Primary Care Physician   Reba Sarabia [facility]    Physical Exam   Temp: 98.2  F (36.8  C) Temp src: Oral BP: (!) 174/104 Pulse: 70   Resp: 16 SpO2: 96 % O2 Device: None (Room air)    There were no vitals filed for this visit.  Vital Signs with Ranges  Temp:  [97.7  F (36.5  C)-99.9  F (37.7  C)] 98.2  F (36.8  C)  Pulse:  [62-73] 70  Resp:  [16-18] 16  BP: (162-184)/() 174/104  SpO2:  [94 %-97 %] 96 %  I/O last 3 completed shifts:  In: 240 [P.O.:240]  Out: -     Patient seen and examined on day of discharge. Feels good. No ongoing symptoms. Discussed blood pressures and antiplatelet medications that she will have on discharge. Speech is clear. Stable for discharge to home.    Constitutional: Awake, alert, cooperative, no apparent distress.  Eyes: Conjunctiva and pupils examined and normal.  HEENT: Moist mucous membranes, normal dentition.  Respiratory: Clear to auscultation bilaterally, no crackles or wheezing.  Cardiovascular: Regular rate and rhythm, normal S1 and S2, and no murmur noted.  GI: Soft, non-distended, non-tender, normal bowel sounds.  Lymph/Hematologic: No anterior cervical or supraclavicular adenopathy.  Skin: No rashes, no cyanosis, no edema.  Musculoskeletal: No joint swelling, erythema or tenderness.  Neurologic: Cranial nerves 2-12 intact, normal  strength and sensation.  Psychiatric: Alert, oriented to person, place and time, no obvious anxiety or depression.    Discharge Disposition   Discharged to home  Condition at discharge: Stable    Consultations This Hospital Stay   NEUROLOGY IP CONSULT  PHYSICAL THERAPY ADULT IP CONSULT  OCCUPATIONAL THERAPY ADULT IP CONSULT  SPEECH LANGUAGE PATH ADULT IP CONSULT  SWALLOW EVAL SPEECH PATH AT BEDSIDE IP CONSULT  SMOKING CESSATION PROGRAM IP CONSULT  PATIENT LEARNING CENTER IP CONSULT    Time Spent on this Encounter   INathalie DO, personally saw the patient today and spent greater than 30 minutes discharging this patient.    Discharge Orders      NEUROSURGERY REFERRAL      Reason for your hospital stay    TIA     Follow-up and recommended labs and tests     Follow up with primary care provider, Reba Ly, within 7 days for hospital follow- up.  The following labs/tests are recommended: BMP in 2 weeks. Bring log of blood pressures to your follow-up appointment.    Follow up with neurology in 6-8 weeks in spine and brain clinic.     Activity    Your activity upon discharge: activity as tolerated     Monitor and record    blood pressure daily. Keep log of blood pressures prior to your morning medication (lisinopril) and then about 2-3 hours after you take it. You were given a low dose of medication, 10mg, to start with. This can be increased as needed to control your blood pressures with the help of your PCP.     Discharge Instructions    1. Since we are avoiding statins, you can take zetia to try to lower your cholesterol. You can also discuss repatha with your PCP (an additional choice to help lower your cholesterol).    2.You will take aspirin 81mg (baby aspirin) and plavix (75mg) once daily for 3 MONTHS (this is different from what Dr Austin initially thought). After you complete 90 days of treatment, you will then take 325mg Aspirin daily (full dose aspirin) and this can be purchased over the  counter.     3. Check with the red cross to determine if you can donate blood while taking plavix.    4. You were started on low dose lisinopril, 10mg. You may need this medication to be increased depending on how your home blood pressures look. Your eventual goal blood pressure is less than 130/80     Full Code     Cardiac Event Monitor Adult Pediatric     Diet    Follow this diet upon discharge:   Regular Diet Adult     Discharge Medications   Current Discharge Medication List      START taking these medications    Details   aspirin (ASA) 81 MG EC tablet Take 1 tablet (81 mg) by mouth daily  Qty: 90 tablet, Refills: 0    Comments: Future refills by PCP Dr. Reba Ly with phone number None.  Associated Diagnoses: TIA (transient ischemic attack)      clopidogrel (PLAVIX) 75 MG tablet Take 1 tablet (75 mg) by mouth daily  Qty: 90 tablet, Refills: 0    Comments: Future refills by PCP Dr. Reba Ly with phone number None.  Associated Diagnoses: TIA (transient ischemic attack)      ezetimibe (ZETIA) 10 MG tablet Take 1 tablet (10 mg) by mouth At Bedtime  Qty: 30 tablet, Refills: 0    Comments: Future refills by PCP Dr. Reba Ly with phone number None.  Associated Diagnoses: TIA (transient ischemic attack)      lisinopril (ZESTRIL) 10 MG tablet Take 1 tablet (10 mg) by mouth daily  Qty: 30 tablet, Refills: 0    Comments: Future refills by PCP Dr. Reba Ly with phone number None.  Associated Diagnoses: TIA (transient ischemic attack)         CONTINUE these medications which have NOT CHANGED    Details   Acidophilus Lactobacillus CAPS Take 3 capsules by mouth At Bedtime Mother [employer] Brand      Glucosamine-Chondroitin 250-200 MG TABS Take 2 tablets by mouth daily      Multiple Vitamins-Minerals (AIRBORNE GUMMIES PO) Take 3 chew tab by mouth daily      TURMERIC PO Take 2 tablets by mouth daily      vitamin C (ASCORBIC ACID) 250 MG tablet Take 250 mg by mouth  daily      Vitamin D3 (CHOLECALCIFEROL) 125 MCG (5000 UT) tablet Take 125 mcg by mouth daily      albuterol (PROAIR HFA/PROVENTIL HFA/VENTOLIN HFA) 108 (90 Base) MCG/ACT inhaler Inhale 2 puffs into the lungs every 6 hours as needed for wheezing    Comments: Pharmacy may dispense brand covered by insurance (Proair, or proventil or ventolin or generic albuterol inhaler)      clobetasol (TEMOVATE) 0.05 % external cream Apply topically 2 times daily as needed      triamcinolone (KENALOG) 0.1 % external cream Apply topically 2 times daily as needed for irritation           Allergies   Allergies   Allergen Reactions     Morphine Anaphylaxis     Hives, throat swells and can't breath.     Data   Most Recent 3 CBC's:No lab results found.   Most Recent 3 BMP's:No lab results found.  Most Recent 2 LFT's:No lab results found.  Most Recent INR's and Anticoagulation Dosing History:  Anticoagulation Dose History     There is no flowsheet data to display.        Most Recent 3 Troponin's:  Recent Labs   Lab Test 02/08/21  0428 02/07/21 2031   TROPI <0.015 <0.015     Most Recent Cholesterol Panel:  Recent Labs   Lab Test 02/07/21 2031   CHOL 300*   LDL Cannot estimate LDL when triglyceride exceeds 400 mg/dL  188*   HDL 54   TRIG 423*     Most Recent 6 Bacteria Isolates From Any Culture (See EPIC Reports for Culture Details):No lab results found.  Most Recent TSH, T4 and A1c Labs:  Recent Labs   Lab Test 02/07/21 2031   A1C 5.4     Results for orders placed or performed during the hospital encounter of 02/07/21   MRI Brain w & w/o contrast    Narrative    EXAM: MR BRAIN W/O AND W CONTRAST  LOCATION: [facility]  DATE/TIME: 2/7/2021 9:31 PM    INDICATION: Transient ischemic attack (TIA).  COMPARISON: CTA head and neck dated 02/07/2021.  CONTRAST: 10ml Gadavist.  TECHNIQUE: Routine multiplanar multisequence head MRI without and with intravenous contrast.    FINDINGS:  INTRACRANIAL CONTENTS: There is linear diffusion  hyperintensity within one of the sulci along the lateral left frontal lobe, with associated susceptibility and FLAIR hyperintensity. This appears to correlate with an M4 branch occlusion on the CTA (series   14, image 48 of the CTA of the head). No definite parenchymal diffusion restriction although a small cortical component cannot be entirely excluded. Chronic lacunar infarcts in the bilateral basal ganglia. Small chronic infarct in the left cerebellum.   No acute intracranial hemorrhage or adverse intracranial mass effect. Patchy nonspecific T2/FLAIR hyperintensities within the cerebral white matter and ashley most consistent with mild to moderate chronic microvascular ischemic change. Mild generalized   cerebral atrophy. No hydrocephalus. Normal position of the cerebellar tonsils. No pathologic contrast enhancement.    SELLA: No abnormality accounting for technique.    OSSEOUS STRUCTURES/SOFT TISSUES: Focal enhancement along the left frontal calvarium appears to correspond with a small intraosseous hemangioma. The proximal flow voids appear preserved.     ORBITS: No abnormality accounting for technique.     SINUSES/MASTOIDS: Mucosal thickening primarily involving the ethmoid air cells. No middle ear or mastoid effusion.       Impression    IMPRESSION:  1.  Linear hyperintensity within one of the sulci along the lateral left frontal lobe with associated FLAIR hyperintensity and susceptibility. This appears to correspond with a segmental M4 branch occlusion on the prior CTA. No definite parenchymal   diffusion restriction, however, a subtle cortical infarct cannot be entirely excluded.  2.  Age-related changes, with mild generalized parenchymal volume loss and mild to moderate sequelae of chronic microvascular ischemic disease. Chronic lacunar infarcts in the bilateral basal ganglia and left cerebellum.       Echocardiogram Complete - Bubble study    Narrative    [id number]  [id number]  [id number]  [id number]^SUSAN^LYDIA^E            [facility]  Echocardiography Laboratory  [address] [place] [address]        Name: DAWSON RODRIGUEZ  MRN: [id number]  : 1960  Study Date: 2021 10:49 AM  Age: 61 yrs  Gender: Female  Patient Location: [facility]  Reason For Study: CVI  Ordering Physician: LYDIA DAVIS  Referring Physician: DANIEL RODRIGUEZ  Performed By: Naomi Velasco     BSA: 2.0 m2  Height: 63 in  Weight: 225 lb  HR: 62  _____________________________________________________________________________  __        Procedure  Complete Portable Bubble Echo Adult.  _____________________________________________________________________________  __        Interpretation Summary     The visual ejection fraction is estimated at 60-65%.  The right ventricle is normal in structure, function and size.  A contrast injection (Bubble Study) was performed that was negative for flow  across the interatrial septum.  The ascending aorta is Mildly dilated.  There is no comparison study available.  _____________________________________________________________________________  __        Left Ventricle  The left ventricle is normal in structure, function and size. There is normal  left ventricular wall thickness. Left ventricular systolic function is normal.  The visual ejection fraction is estimated at 60-65%. Left ventricular  diastolic function is normal. No regional wall motion abnormalities noted.     Right Ventricle  The right ventricle is normal in structure, function and size.     Atria  Normal left atrial size. Right atrial size is normal. A contrast injection  (Bubble Study) was performed that was negative for flow across the interatrial  septum.     Mitral Valve  There is mild mitral annular calcification. There is no mitral regurgitation  noted.        Tricuspid Valve  The tricuspid valve is normal in structure and function. There is trace  tricuspid regurgitation.     Aortic Valve  There is mild trileaflet  aortic sclerosis. No aortic regurgitation is present.     Pulmonic Valve  The pulmonic valve is not well visualized. There is trace pulmonic valvular  regurgitation.     Vessels  Borderline aortic root dilatation. The ascending aorta is Mildly dilated. The  inferior vena cava is normal.     Pericardium  There is no pericardial effusion.        Rhythm  Sinus rhythm was noted.  _____________________________________________________________________________  __  MMode/2D Measurements & Calculations     IVSd: 1.1 cm  LVIDd: 4.2 cm  LVIDs: 2.9 cm  LVPWd: 1.2 cm  FS: 31.0 %  LV mass(C)d: 166.4 grams  LV mass(C)dI: 81.9 grams/m2  Ao root diam: 3.6 cm  LA dimension: 4.1 cm  asc Aorta Diam: 3.8 cm  LA/Ao: 1.1  LA Volume (BP): 74.0 ml  LA Volume Index (BP): 36.5 ml/m2  RWT: 0.56           Doppler Measurements & Calculations  MV E max reginald: 93.1 cm/sec  MV A max reginald: 93.6 cm/sec  MV E/A: 0.99  MV dec slope: 439.0 cm/sec2  PA acc time: 0.12 sec  E/E' av.1  Lateral E/e': 8.3  Medial E/e': 15.9           _____________________________________________________________________________  __           Report approved by: Heather Martinez 2021 11:45 AM

## 2021-02-09 ENCOUNTER — APPOINTMENT (OUTPATIENT)
Dept: GENERAL RADIOLOGY | Facility: CLINIC | Age: 61
DRG: 069 | End: 2021-02-09
Attending: PHYSICIAN ASSISTANT
Payer: COMMERCIAL

## 2021-02-09 LAB
ANION GAP SERPL CALCULATED.3IONS-SCNC: 4 MMOL/L (ref 3–14)
APPEARANCE CSF: CLEAR
BUN SERPL-MCNC: 14 MG/DL (ref 7–30)
CALCIUM SERPL-MCNC: 9.7 MG/DL (ref 8.5–10.1)
CHLORIDE SERPL-SCNC: 109 MMOL/L (ref 94–109)
CO2 SERPL-SCNC: 26 MMOL/L (ref 20–32)
COLOR CSF: COLORLESS
CREAT SERPL-MCNC: 0.8 MG/DL (ref 0.52–1.04)
CRP SERPL-MCNC: <2.9 MG/L (ref 0–8)
ERYTHROCYTE [DISTWIDTH] IN BLOOD BY AUTOMATED COUNT: 14.1 % (ref 10–15)
ERYTHROCYTE [SEDIMENTATION RATE] IN BLOOD BY WESTERGREN METHOD: 16 MM/H (ref 0–30)
GFR SERPL CREATININE-BSD FRML MDRD: 79 ML/MIN/{1.73_M2}
GLUCOSE CSF-MCNC: 55 MG/DL (ref 40–70)
GLUCOSE SERPL-MCNC: 108 MG/DL (ref 70–99)
GRAM STN SPEC: NORMAL
HCT VFR BLD AUTO: 41.6 % (ref 35–47)
HGB BLD-MCNC: 13 G/DL (ref 11.7–15.7)
MCH RBC QN AUTO: 27.7 PG (ref 26.5–33)
MCHC RBC AUTO-ENTMCNC: 31.3 G/DL (ref 31.5–36.5)
MCV RBC AUTO: 89 FL (ref 78–100)
PLATELET # BLD AUTO: 306 10E9/L (ref 150–450)
POTASSIUM SERPL-SCNC: 3.6 MMOL/L (ref 3.4–5.3)
PROT CSF-MCNC: 34 MG/DL (ref 15–60)
RBC # BLD AUTO: 4.7 10E12/L (ref 3.8–5.2)
RBC # CSF MANUAL: 0 /UL (ref 0–2)
RBC # CSF MANUAL: NORMAL /UL (ref 0–2)
SODIUM SERPL-SCNC: 139 MMOL/L (ref 133–144)
SPECIMEN SOURCE: NORMAL
TUBE # CSF: 4 #
WBC # BLD AUTO: 5.7 10E9/L (ref 4–11)
WBC # CSF MANUAL: 1 /UL (ref 0–5)
WBC # CSF MANUAL: NORMAL /UL (ref 0–5)

## 2021-02-09 PROCEDURE — 85652 RBC SED RATE AUTOMATED: CPT | Performed by: PSYCHIATRY & NEUROLOGY

## 2021-02-09 PROCEDURE — 99225 PR SUBSEQUENT OBSERVATION CARE,LEVEL II: CPT | Performed by: HOSPITALIST

## 2021-02-09 PROCEDURE — 87205 SMEAR GRAM STAIN: CPT | Performed by: PHYSICIAN ASSISTANT

## 2021-02-09 PROCEDURE — 87015 SPECIMEN INFECT AGNT CONCNTJ: CPT | Performed by: PHYSICIAN ASSISTANT

## 2021-02-09 PROCEDURE — 87529 HSV DNA AMP PROBE: CPT | Performed by: PHYSICIAN ASSISTANT

## 2021-02-09 PROCEDURE — 87798 DETECT AGENT NOS DNA AMP: CPT | Performed by: PHYSICIAN ASSISTANT

## 2021-02-09 PROCEDURE — 85027 COMPLETE CBC AUTOMATED: CPT | Performed by: PSYCHIATRY & NEUROLOGY

## 2021-02-09 PROCEDURE — 99207 PR CDG-CODE CATEGORY CHANGED: CPT | Performed by: HOSPITALIST

## 2021-02-09 PROCEDURE — 87070 CULTURE OTHR SPECIMN AEROBIC: CPT | Performed by: PHYSICIAN ASSISTANT

## 2021-02-09 PROCEDURE — 36415 COLL VENOUS BLD VENIPUNCTURE: CPT | Performed by: PSYCHIATRY & NEUROLOGY

## 2021-02-09 PROCEDURE — 250N000013 HC RX MED GY IP 250 OP 250 PS 637: Performed by: PHYSICIAN ASSISTANT

## 2021-02-09 PROCEDURE — 84157 ASSAY OF PROTEIN OTHER: CPT | Performed by: PHYSICIAN ASSISTANT

## 2021-02-09 PROCEDURE — G0378 HOSPITAL OBSERVATION PER HR: HCPCS

## 2021-02-09 PROCEDURE — 258N000003 HC RX IP 258 OP 636: Performed by: HOSPITALIST

## 2021-02-09 PROCEDURE — 250N000013 HC RX MED GY IP 250 OP 250 PS 637: Performed by: HOSPITALIST

## 2021-02-09 PROCEDURE — 89050 BODY FLUID CELL COUNT: CPT | Performed by: PHYSICIAN ASSISTANT

## 2021-02-09 PROCEDURE — 86140 C-REACTIVE PROTEIN: CPT | Performed by: PSYCHIATRY & NEUROLOGY

## 2021-02-09 PROCEDURE — 250N000013 HC RX MED GY IP 250 OP 250 PS 637: Performed by: INTERNAL MEDICINE

## 2021-02-09 PROCEDURE — 009U3ZX DRAINAGE OF SPINAL CANAL, PERCUTANEOUS APPROACH, DIAGNOSTIC: ICD-10-PCS | Performed by: PHYSICIAN ASSISTANT

## 2021-02-09 PROCEDURE — 62270 DX LMBR SPI PNXR: CPT

## 2021-02-09 PROCEDURE — 82945 GLUCOSE OTHER FLUID: CPT | Performed by: PHYSICIAN ASSISTANT

## 2021-02-09 PROCEDURE — 99233 SBSQ HOSP IP/OBS HIGH 50: CPT | Mod: GC | Performed by: PSYCHIATRY & NEUROLOGY

## 2021-02-09 PROCEDURE — 80048 BASIC METABOLIC PNL TOTAL CA: CPT | Performed by: PSYCHIATRY & NEUROLOGY

## 2021-02-09 PROCEDURE — 77003 FLUOROGUIDE FOR SPINE INJECT: CPT

## 2021-02-09 RX ORDER — CLOPIDOGREL BISULFATE 75 MG/1
75 TABLET ORAL DAILY
Status: DISCONTINUED | OUTPATIENT
Start: 2021-02-09 | End: 2021-02-10 | Stop reason: HOSPADM

## 2021-02-09 RX ORDER — NICOTINE POLACRILEX 4 MG
15-30 LOZENGE BUCCAL
Status: CANCELLED | OUTPATIENT
Start: 2021-02-09

## 2021-02-09 RX ORDER — DEXTROSE MONOHYDRATE 25 G/50ML
25-50 INJECTION, SOLUTION INTRAVENOUS
Status: CANCELLED | OUTPATIENT
Start: 2021-02-09

## 2021-02-09 RX ADMIN — EZETIMIBE 10 MG: 10 TABLET ORAL at 21:28

## 2021-02-09 RX ADMIN — ACETAMINOPHEN 650 MG: 325 TABLET, FILM COATED ORAL at 16:56

## 2021-02-09 RX ADMIN — LISINOPRIL 10 MG: 10 TABLET ORAL at 09:03

## 2021-02-09 RX ADMIN — SODIUM CHLORIDE 1000 ML: 9 INJECTION, SOLUTION INTRAVENOUS at 16:56

## 2021-02-09 RX ADMIN — CLOPIDOGREL BISULFATE 75 MG: 75 TABLET ORAL at 17:50

## 2021-02-09 RX ADMIN — ASPIRIN 81 MG: 81 TABLET, DELAYED RELEASE ORAL at 09:03

## 2021-02-09 NOTE — PROGRESS NOTES
RADIOLOGY PROCEDURE NOTE  Patient name: Lissette Wong  MRN: [id number]  : 1960    Pre-procedure diagnosis: Possible vaculitis  Post-procedure diagnosis: Same    Procedure Date/Time: 2021  2:05 PM  Procedure: Lumbar Puncture  Estimated blood loss: None  Specimen(s) collected with description: 19 ml of clear CSF  The patient tolerated the procedure well with no immediate complications.  Significant findings:22 cm of water.     See imaging dictation for procedural details.    Provider name: Anil Cavazos PA-C  Assistant(s):None

## 2021-02-09 NOTE — PLAN OF CARE
A&O x4. VS hypertensive on RA. Tele NSR. CMS intact. Neuros  intact. Lungs clear. BS audible, BM-, flatus+. Tolerating regular diet. Denies N/V. Voiding adequately. Tylenol for pain. Up SBA/independent. Possible discharge tomorrow after MRA.

## 2021-02-09 NOTE — PROVIDER NOTIFICATION
"MD Notification    Notified Person: MD    Notified Person Name: Dr. Mercer    Notification Date/Time: 2/8/2021 at 1960    Notification Interaction: amcom    Purpose of Notification: \"[facility] - 741-2 - JR - Can we please get additional order for MRI brain w/without contrast for patient? Thank you! BOYD Colón *[id number]/ [phone]\"    Orders Received:    Comments:    "

## 2021-02-09 NOTE — PROGRESS NOTES
[facility]    Hospitalist Progress Note      Assessment & Plan   Lissette Wong is a 61 year old female who was admitted on 2/7/2021 as transfer from [facility] after experiencing an episode of aphasia with reported left-sided facial weakness. Her head CT showed focal narrowing of the M2 segment of the MCA and TPA was going to be administered, and then symptoms resolved    TIA with transient aphasia and left facial weakness, resolved  Hyperlipidemia  Hypertension  Intracranial stenosis (left MCA)  CT head showed suggestion of increased hyperdensity within the M2 branch of the left MCA. CTA head and neck showed short segment high-grade narrowing within the left MCA. MRI Brain: linear hyperintensity along lateral left frontal lobe sulci--consistent with M4 branch occlusion on CTA, age related changes, chronic lacunar infarcts.  *Echo 2/8: EF 60-65%, RV normal. Bubble study negative  * MRA brain 2/8: severe stenosis of origins of superior and inferior division branches of left MCA, possible atherosclerotic narrowing of right posterior cerebral artery and basilar artery.  * LP 2/9: cell counts WNL, no suspicion inflammatory or infectious etioogy  - family has not tolerated statins in the past with adverse effects--she declines to trial statin  - Started on zetia once daily, 10mg--discuss repatha as outpatient with PCP. She is willing to try zetia, but is nervous about side effects (given 1 mo prescription)  - DAPT with ASA 81mg and plavix 75mg daily for 3 MONTHS and then aspirin 325mg daily thereafter (given prescriptions for 3 months of initial med)   ---first dose plavix 2/9 evening  - Goal blood pressure <130/80, near 160/80 in hospital. Sister has tolerated lisinopril in the past, started on 10mg lisinopril once daily with recommendations to check BP twice daily and report log to PCP for further titration   ---Neuro will connect patient with research coordinator to discuss mGlide   ---  recommend BMP in 2 weeks after initiation of lisinopril  - outpatient cardiac monitor for 30 days  - Follow up with PCP in 1-2 weeks  - Follow up with neurology in 6-8 weeks  - 1L IVF overnight to help reduce risk of post LP headache     H/o TBI, subdural hemorrhage, closed fracture of R orbital floor in March 2019 after falling down stairs. Hospitalized at [facility]. Patient notes irritability and decreased concentration since that stay.      Migraines with aura patient states for the last several years she has just had aura without migraines.  - Noted     Morbid obesity contributes to morbidity associated with above    DVT Prophylaxis: Pneumatic Compression Devices  Code Status: Full Code  Expected discharge: Tomorrow, recommended to prior living arrangement once MRA Brain completed    Nathalie Austin, DO  Text Page (7am - 6pm)    Interval History   Patient seen and examined.  at bedside. Summarized care plan and answered any leftover questions after neurology recommendations placed. She feels good today, was upset last evening and again this AM, but is starting to come to terms with diagnosis and plans.    -Data reviewed today: I reviewed all new labs and imaging results over the last 24 hours. I personally reviewed MRA brain 2/8: severe stenosis of origins of superior and inferior division branches of left MCA, possible atherosclerotic narrowing of right posterior cerebral artery and basilar artery.    Physical Exam   Temp: 97.5  F (36.4  C) Temp src: Oral BP: (!) 149/78 Pulse: 74   Resp: 14 SpO2: 100 % O2 Device: None (Room air)    There were no vitals filed for this visit.  Vital Signs with Ranges  Temp:  [97.5  F (36.4  C)-98.3  F (36.8  C)] 97.5  F (36.4  C)  Pulse:  [67-75] 74  Resp:  [14-16] 14  BP: (134-168)/() 149/78  SpO2:  [95 %-100 %] 100 %  I/O last 3 completed shifts:  In: 2180 [P.O.:2180]  Out: -     Constitutional: Awake, alert, cooperative, no apparent distress  Respiratory: Clear to  auscultation bilaterally, no crackles or wheezing  Cardiovascular: Regular rate and rhythm, normal S1 and S2, and no murmur noted  GI: Normal bowel sounds, soft, non-distended, non-tender  Skin/Integumen: No rashes, no cyanosis, no edema  Other: No focal deficits    Medications     - MEDICATION INSTRUCTIONS -       sodium chloride 0.9%         sodium chloride 0.9%  1,000 mL Intravenous Once     aspirin  81 mg Oral Daily     clopidogrel  75 mg Oral Daily     ezetimibe  10 mg Oral At Bedtime     lisinopril  10 mg Oral Daily       Data   Recent Labs   Lab 02/09/21  0825 02/08/21 0428 02/07/21 2031   WBC 5.7  --   --    HGB 13.0  --   --    MCV 89  --   --      --   --      --   --    POTASSIUM 3.6  --   --    CHLORIDE 109  --   --    CO2 26  --   --    BUN 14  --   --    CR 0.80  --   --    ANIONGAP 4  --   --    IFEANYI 9.7  --   --    *  --   --    TROPI  --  <0.015 <0.015       Recent Results (from the past 24 hour(s))   MRA Brain ([place]) wo Contrast    Narrative    MR ANGIOGRAM OF THE HEAD WITHOUT CONTRAST   2/8/2021 8:56 PM     COMPARISON: [facility] [place] CTA 2/7/2021    HISTORY: Vasculitis, central nervous system (CNS). Headache, chronic,  new features or increased frequency.      TECHNIQUE:  3D time-of-flight MR angiogram of the head without  contrast. MIP reconstruction of all MR angiographic data was  performed.    FINDINGS:  Severe stenosis at the origin of the superior and inferior  division branches of the left middle cerebral artery again noted and  possibly atherosclerotic in nature. 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 The right posterior cerebral artery is  supplied by the patent right posterior communicating artery. There are  multiple focal areas of mild-moderate narrowing throughout the P2  segment of the right posterior cerebral artery that may be  atherosclerotic in nature. There is  focal moderate narrowing at the  midportion of the basilar artery that may be atherosclerotic in  nature. The bilateral distal internal carotid, bilateral anterior  cerebral, right middle cerebral and left posterior cerebral arteries  are patent and unremarkable with no evidence for cerebral artery  stenosis or aneurysm. The anterior communicating artery is patent and  unremarkable. The posterior communicating artery on the right is  patent and unremarkable.       Impression    IMPRESSION:    1. Severe stenosis at the origins of the superior and inferior  division branches of the left middle cerebral artery again noted and  possibly atherosclerotic in nature.  2. Possible areas of atherosclerotic narrowing involving the right  posterior cerebral artery and basilar artery also again noted.  3. Otherwise, normal [place] MRA.      KAVIN AGUILERA MD   MR Brain w/o & w Contrast    Narrative    MRI OF THE BRAIN WITHOUT AND WITH CONTRAST 2/8/2021 9:06 PM     COMPARISON: Diagnostic brain MRI 2/7/2021.    HISTORY:  Findings concerning for vasculitis on recent head CT  angiogram.     TECHNIQUE: Thin section postcontrast (10 mL Gadavist) 3-D T1-weighted  images of the brain were acquired with a black blood technique to  evaluate for vascular wall enhancement.       Impression    IMPRESSION: There is questionable vessel wall thickening and  enhancement at the midportion of the basilar artery at an area of  stenosis noted on the accompanying MRA. There is questionable vessel  wall thickening and enhancement at the left MCA trifurcation at an  area of vascular narrowing noted on the comparison MRA. No other  vascular wall thickening or enhancement is identified in the [place]; specifically, there is no definite evidence for vascular wall  thickening and enhancement of the right posterior cerebral artery  corresponding to subtle irregularity in contour of the P2 segment of  the right posterior cerebral artery on  the accompanying MRA.    KAVIN AGUILERA MD   XR Lumbar Puncture Spinal Tap Diag    Narrative    EXAM: XR LUMBAR PUNCTURE SPINAL TAP DIAGNOSTIC  2/9/2021 2:10 PM       History:  Possible vasculitis versus atherosclerosis. Request for  lumbar puncture for further evaluation.     PROCEDURE: The patient consented for a lumbar puncture. The benefits  and risks of the procedure were explained to the patient, including  but not limited to worsening headache, hemorrhage, infection, lower  extremity pain, or nerve root injury. The patient was sterilely  prepped and draped with the patient in the supine position, over the  lower back. Under fluoroscopic guidance, the interlaminar spaces were  noted. 1% lidocaine was administered for local anesthetic over the  L2-3 interlaminar space, and a 22 gauge needle was advanced into the  thecal sac under fluoroscopic guidance.  There was initial aspiration  of clear CSF. Approximately 19 mL of clear CSF was then aspirated.   The needle was removed. There were no immediate complications  associated with the procedure.   Estimated blood loss during the  procedure was less than 5 mL.    Opening Pressure: 22 cm of water  Fluoro time: 0.1 minutes   Images Obtained: 3      Impression    IMPRESSION: Successful lumbar puncture.    JOYCE ESPARZA PA-C

## 2021-02-09 NOTE — PROGRESS NOTES
A/O x 4, vss, a-febrile, denies pain, up independently in the room and hallway, neuros are intact, patient had a lumber puncture this afternoon, site is CDI, no bleeding, will lie flat until 1530 ( I hr), plan to stay one night, discharge tomorrow.

## 2021-02-09 NOTE — PROGRESS NOTES
"SPIRITUAL HEALTH SERVICES  SPIRITUAL ASSESSMENT Progress Note  [facility]     REFERRAL SOURCE: Consult    I shared a reflective conversation with Lissette and her spouse Dee today, integrating elements of illness and family narratives.     -Lissette was tearful during the visit and spent time processing this event and her hospitalization the last few days. She shares this came quite suddenly and reflected on the changes she is experiencing both being here and anticipating what life will look like when she discharges.    -Lissette reflected on being a  in [employer] tradition for 20 years. 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    -Lissette and Dee reflected on their supports, each other and their dog Shannen and many family and friends. They share they are taking things \"one day at a time.\"    I offered emotional and spiritual support through reflective listening that affirmed emotions, experiences and meaning. They asked for prayer, which I shared as well.     PLAN: [facility] continues to remain available, though it sounds like Lissette will discharge home tomorrow.     Cathy Salas  Associate    Phone: [phone]  Pager: [phone]    "

## 2021-02-09 NOTE — PLAN OF CARE
Pt here with TIA v complex migraine. A&Ox4. Neuros intact. VSS on RA. Tele NSR. Regular diet, Thin liquids. Takes pills whole with water. Up with SBA. Tylenol given for headache. Melatonin given for sleep- slept in between cares. Pt scoring green on the Aggression Stop Light Tool. MRA completed. Plan for discharge home today.

## 2021-02-09 NOTE — PROGRESS NOTES
"  [facility]    Stroke Progress Note    Interval Events  No new symptoms today. Had MRI/MRA completed - see below  ESR/CRP unremarkable    Impression  TIA vs MRI negative stroke, multifocal intracranial stenoses of unclear etiology. Could be intracranial atherosclerosis (has high LDL + BP as risk factors); will also consider vasculitis as another possibility, less likely RCVS given findings on MRA    Plan  - Lumbar puncture today   - Consider DAPT with aspirin and Plavix for 21 days if LP unrevealing  - Treat LDL to goal 40-70 with Repatha- pt will discuss with PCP as outpatient. Intolerant to statins in the past  - Long term tight BP control indicated, BP <130/80 after discharge (ok for permissive HTN SBP <220 goal in inpatient setting)  -30d cardiac rhythm monitor on discharge  -PT/OT/ST    Will follow    Anh Barbosa PA-C  Neurology  02/09/2021 4:24 PM  To page me or covering stroke neurology team member, click here: [facility]   Choose \"On Call\" tab at top, then search dropdown box for \"Neurology Adult\", select location, press Enter, then look for stroke/neuro ICU/telestroke.    ______________________________________________________    Medications     Scheduled Meds    aspirin  81 mg Oral Daily     [Held by provider] clopidogrel  75 mg Oral Daily     ezetimibe  10 mg Oral At Bedtime     lisinopril  10 mg Oral Daily       Infusion Meds    - MEDICATION INSTRUCTIONS -       sodium chloride 0.9%         PRN Meds  acetaminophen, acetaminophen, bisacodyl, hydrALAZINE, labetalol, - MEDICATION INSTRUCTIONS -, melatonin, ondansetron **OR** ondansetron, polyethylene glycol, senna-docusate **OR** senna-docusate, sodium chloride 0.9%       PHYSICAL EXAMINATION  Temp:  [97.5  F (36.4  C)-98.3  F (36.8  C)] 97.5  F (36.4  C)  Pulse:  [67-75] 74  Resp:  [14-16] 14  BP: (134-174)/() 149/78  SpO2:  [95 %-100 %] 100 %     Neurologic  Mental Status:  alert, oriented to situation, follows commands, " speech clear and fluent  Cranial Nerves:  EOMI with normal smooth pursuit, facial sensation intact and symmetric, facial movements symmetric, hearing not formally tested but intact to conversation, no dysarthria  Motor:  moves all extremities spontaneously without any obvious hemiparesis  Reflexes:  deferred  Sensory: deferred  Coordination:  deferred, no obvious ataxia while using phone with both hands  Station/Gait:  normal, observed walking in hallways    Imaging  I personally reviewed all imaging; relevant findings per HPI.     MRI BRAIN:  IMPRESSION: There is questionable vessel wall thickening and  enhancement at the midportion of the basilar artery at an area of  stenosis noted on the accompanying MRA. There is questionable vessel  wall thickening and enhancement at the left MCA trifurcation at an  area of vascular narrowing noted on the comparison MRA. No other  vascular wall thickening or enhancement is identified in the [place]; specifically, there is no definite evidence for vascular wall  thickening and enhancement of the right posterior cerebral artery  corresponding to subtle irregularity in contour of the P2 segment of  the right posterior cerebral artery on the accompanying MRA.    MRA head:  IMPRESSION:    1. Severe stenosis at the origins of the superior and inferior  division branches of the left middle cerebral artery again noted and  possibly atherosclerotic in nature.  2. Possible areas of atherosclerotic narrowing involving the right  posterior cerebral artery and basilar artery also again noted.  3. Otherwise, normal [place] MRA.      MRI BRAIN:  IMPRESSION:  1.  Linear hyperintensity within one of the sulci along the lateral left frontal lobe with associated FLAIR hyperintensity and susceptibility. This appears to correspond with a segmental M4 branch occlusion on the prior CTA. No definite parenchymal   diffusion restriction, however, a subtle cortical infarct cannot be  entirely excluded.  2.  Age-related changes, with mild generalized parenchymal volume loss and mild to moderate sequelae of chronic microvascular ischemic disease. Chronic lacunar infarcts in the bilateral basal ganglia and left cerebellum.    Lab Results Data   CBC  Recent Labs   Lab 02/09/21  0825   WBC 5.7   RBC 4.70   HGB 13.0   HCT 41.6        Basic Metabolic Panel    Recent Labs   Lab 02/09/21  0825      POTASSIUM 3.6   CHLORIDE 109   CO2 26   BUN 14   CR 0.80   *   IFEANYI 9.7     Liver Panel  No results for input(s): PROTTOTAL, ALBUMIN, BILITOTAL, ALKPHOS, AST, ALT, BILIDIRECT in the last 168 hours.  INR  No lab results found.   Lipid Profile    Recent Labs   Lab Test 02/07/21 2031   CHOL 300*   HDL 54   LDL Cannot estimate LDL when triglyceride exceeds 400 mg/dL  188*   TRIG 423*     A1C    Recent Labs   Lab Test 02/07/21 2031   A1C 5.4     LDL Cholesterol Calculated   Date Value Ref Range Status   02/07/2021  <100 mg/dL Final    Cannot estimate LDL when triglyceride exceeds 400 mg/dL     LDL Cholesterol Direct   Date Value Ref Range Status   02/07/2021 188 (H) <100 mg/dL Final     Comment:     Above desirable:  100-129 mg/dl  Borderline High:  130-159 mg/dL  High:             160-189 mg/dL  Very high:       >189 mg/dl           Troponin I    Recent Labs   Lab 02/08/21  0428 02/07/21 2031   TROPI <0.015 <0.015

## 2021-02-10 ENCOUNTER — APPOINTMENT (OUTPATIENT)
Dept: CARDIOLOGY | Facility: CLINIC | Age: 61
DRG: 069 | End: 2021-02-10
Attending: PHYSICIAN ASSISTANT
Payer: COMMERCIAL

## 2021-02-10 VITALS
RESPIRATION RATE: 16 BRPM | SYSTOLIC BLOOD PRESSURE: 156 MMHG | OXYGEN SATURATION: 92 % | TEMPERATURE: 98.8 F | DIASTOLIC BLOOD PRESSURE: 93 MMHG | WEIGHT: 219.8 LBS | HEART RATE: 81 BPM

## 2021-02-10 PROCEDURE — G0378 HOSPITAL OBSERVATION PER HR: HCPCS

## 2021-02-10 PROCEDURE — 99232 SBSQ HOSP IP/OBS MODERATE 35: CPT | Mod: GC | Performed by: PSYCHIATRY & NEUROLOGY

## 2021-02-10 PROCEDURE — 120N000001 HC R&B MED SURG/OB

## 2021-02-10 PROCEDURE — 93272 ECG/REVIEW INTERPRET ONLY: CPT | Performed by: INTERNAL MEDICINE

## 2021-02-10 PROCEDURE — 93270 REMOTE 30 DAY ECG REV/REPORT: CPT

## 2021-02-10 PROCEDURE — 250N000013 HC RX MED GY IP 250 OP 250 PS 637: Performed by: HOSPITALIST

## 2021-02-10 PROCEDURE — 99239 HOSP IP/OBS DSCHRG MGMT >30: CPT | Performed by: HOSPITALIST

## 2021-02-10 RX ADMIN — ASPIRIN 81 MG: 81 TABLET, DELAYED RELEASE ORAL at 09:41

## 2021-02-10 RX ADMIN — CLOPIDOGREL BISULFATE 75 MG: 75 TABLET ORAL at 09:41

## 2021-02-10 RX ADMIN — LISINOPRIL 10 MG: 10 TABLET ORAL at 09:41

## 2021-02-10 NOTE — DISCHARGE SUMMARY
[facility]    Discharge Summary  Hospitalist    Date of Admission:  2/7/2021  Date of Discharge:  2/10/2021  Discharging Provider: Nathalie Austin DO  Date of Service (when I saw the patient): 02/10/21    Discharge Diagnoses   TIA vs MRI negative stroke with transient aphasia and left facial weakness, resolved  Multifocal intracranial stenoses  Hyperlipidemia  Hypertension  H/o TBI, subdural hemorrhage, closed fracture of R orbital floor   Migraines with aura   Morbid obesity    History of Present Illness   Lissette Wong is an 61 year old female who presented with an episode of aphasia with reported left-sided facial weakness. Her head CT showed focal narrowing of the M2 segment of the MCA and TPA was going to be administered, and then symptoms resolved.    Hospital Course   Lissette Wong was admitted on 2/7/2021.  The following problems were addressed during her hospitalization:    TIA vs MRI negative stroke with transient aphasia and left facial weakness, resolved  Multifocal intracranial stenoses likely secondary to intracranial atherosclerosis  Hyperlipidemia  Hypertension  CT head showed suggestion of increased hyperdensity within the M2 branch of the left MCA. CTA head and neck showed short segment high-grade narrowing within the left MCA. MRI Brain: linear hyperintensity along lateral left frontal lobe sulci--consistent with M4 branch occlusion on CTA, age related changes, chronic lacunar infarcts.  *Echo 2/8: EF 60-65%, RV normal. Bubble study negative  * MRA brain 2/8: severe stenosis of origins of superior and inferior division branches of left MCA, possible atherosclerotic narrowing of right posterior cerebral artery and basilar artery.  * LP 2/9: cell counts WNL, no suspicion inflammatory or infectious etioogy. Performed to rule out vasculitis.  - family has not tolerated statins in the past with adverse effects--she declines to trial statin  - Started on zetia once daily,  10mg--discuss repatha as outpatient with PCP. She is willing to try zetia, but is nervous about side effects (given 1 mo prescription)  - DAPT with ASA 81mg and plavix 75mg daily for 3 MONTHS and then aspirin 325mg daily thereafter (given prescriptions for 3 months of initial med)   ---first dose plavix given 2/9  - Goal blood pressure <130/80. Sister has tolerated lisinopril in the past, started on 10mg lisinopril once daily with recommendations to check BP twice daily and report log to PCP for further titration   ---Neuro will connect patient with research coordinator to discuss mGlide   --- recommend BMP in 2 weeks after initiation of lisinopril  - outpatient cardiac monitor for 30 days  - Follow up with PCP in 1-2 weeks  - Follow up with neurology in 6-8 weeks     H/o TBI, subdural hemorrhage, closed fracture of R orbital floor in March 2019 after falling down stairs. Hospitalized at [facility]. Patient notes irritability and decreased concentration since that stay.      Migraines with aura patient states for the last several years she has just had aura without migraines.  - Noted     Morbid obesity contributes to morbidity associated with above    Nathalie Austin, DO    Significant Results and Procedures   See above    Pending Results    CSF culture/gram stain, so far no organisms with few WBCs seen. Neuro will follow up with patient.    Code Status   Full Code       Primary Care Physician   Humberto Og    Physical Exam   Temp: 98.1  F (36.7  C) Temp src: Oral BP: 132/74 Pulse: 63   Resp: 16 SpO2: 96 % O2 Device: None (Room air)    Vitals:    02/10/21 0500   Weight: 99.7 kg (219 lb 12.8 oz)     Vital Signs with Ranges  Temp:  [97.5  F (36.4  C)-98.5  F (36.9  C)] 98.1  F (36.7  C)  Pulse:  [63-75] 63  Resp:  [14-16] 16  BP: (132-168)/() 132/74  SpO2:  [94 %-100 %] 96 %  No intake/output data recorded.    Patient seen and examined on day of discharge. Feels good. No ongoing symptoms. Discussed blood  pressure, cholesterol and antiplatelet medications that she will have on discharge. Discussed her anxiety regarding her diagnosis at length. Speech is clear. Stable for discharge to home.    Constitutional: Awake, alert, cooperative, no apparent distress.  Eyes: Conjunctiva and pupils examined and normal.  HEENT: Moist mucous membranes, normal dentition.  Respiratory: Clear to auscultation bilaterally, no crackles or wheezing.  Cardiovascular: Regular rate and rhythm, normal S1 and S2, and no murmur noted.  GI: Soft, non-distended, non-tender, normal bowel sounds.  Lymph/Hematologic: No anterior cervical or supraclavicular adenopathy.  Skin: No rashes, no cyanosis, no edema.  Musculoskeletal: No joint swelling, erythema or tenderness.  Neurologic: Cranial nerves 2-12 intact, normal strength and sensation.  Psychiatric: Alert, oriented to person, place and time, no obvious anxiety or depression.    Discharge Disposition   Discharged to home  Condition at discharge: Stable    Consultations This Hospital Stay   NEUROLOGY IP CONSULT  PHYSICAL THERAPY ADULT IP CONSULT  OCCUPATIONAL THERAPY ADULT IP CONSULT  SPEECH LANGUAGE PATH ADULT IP CONSULT  SWALLOW EVAL SPEECH PATH AT BEDSIDE IP CONSULT  SMOKING CESSATION PROGRAM IP CONSULT  PATIENT LEARNING CENTER IP CONSULT  SPIRITUAL HEALTH SERVICES IP CONSULT    Time Spent on this Encounter   INathalie DO, personally saw the patient today and spent greater than 30 minutes discharging this patient.    Discharge Orders      NEUROSURGERY REFERRAL      Reason for your hospital stay    TIA     Activity    Your activity upon discharge: activity as tolerated     Monitor and record    blood pressure daily. Keep log of blood pressures prior to your morning medication (lisinopril) and then about 2-3 hours after you take it. You were given a low dose of medication, 10mg, to start with. This can be increased as needed to control your blood pressures with the help of your PCP.      Activity: Bedrest OUTPATIENT    Bedrest with head of bed flat for 1 hour then discharge. Patient is allowed to have head up for meals and for bathroom privileges only.     Follow-up and recommended labs and tests     Follow up with primary care provider, Reba Ly, within 7 days for hospital follow- up.  The following labs/tests are recommended: BMP in 2 weeks. Bring log of blood pressures to your follow-up appointment.    Follow up with neurology in 6-8 weeks in spine and brain clinic. You were seen by Dr Oxana White in the hospital     Discharge Instructions    1. Since we are avoiding statins, you can take zetia to try to lower your cholesterol. You can also discuss repatha with your PCP (an additional choice to help lower your cholesterol).    2.You will take aspirin 81mg (baby aspirin) and plavix (75mg) once daily for 3 MONTHS. After you complete 90 days of treatment, you will then take 325mg Aspirin daily (full dose aspirin) and this can be purchased over the counter.     3. Check with the red cross to determine if you can donate blood while taking plavix.    4. You were started on low dose lisinopril, 10mg. You may need this medication to be increased depending on how your home blood pressures look. Your eventual goal blood pressure is less than 130/80    5. Avoid taking ibuprofen, aleve (NSAIDs) while you are on plavix and aspirin. Safe to take tylenol.     Full Code     Cardiac Event Monitor Adult Pediatric     Diet    Follow this diet upon discharge:   Regular Diet Adult     Discharge Medications   Current Discharge Medication List      START taking these medications    Details   aspirin (ASA) 81 MG EC tablet Take 1 tablet (81 mg) by mouth daily  Qty: 90 tablet, Refills: 0    Comments: Future refills by PCP Dr. Reba Ly with phone number None.  Associated Diagnoses: TIA (transient ischemic attack)      clopidogrel (PLAVIX) 75 MG tablet Take 1 tablet (75 mg)  by mouth daily  Qty: 90 tablet, Refills: 0    Comments: Future refills by PCP Dr. Reba Ly with phone number None.  Associated Diagnoses: TIA (transient ischemic attack)      ezetimibe (ZETIA) 10 MG tablet Take 1 tablet (10 mg) by mouth At Bedtime  Qty: 30 tablet, Refills: 0    Comments: Future refills by PCP Dr. Reba Ly with phone number None.  Associated Diagnoses: TIA (transient ischemic attack)      lisinopril (ZESTRIL) 10 MG tablet Take 1 tablet (10 mg) by mouth daily  Qty: 30 tablet, Refills: 0    Comments: Future refills by PCP Dr. Reba Ly with phone number None.  Associated Diagnoses: TIA (transient ischemic attack)         CONTINUE these medications which have NOT CHANGED    Details   Acidophilus Lactobacillus CAPS Take 3 capsules by mouth At Bedtime Mother [employer] Brand      Glucosamine-Chondroitin 250-200 MG TABS Take 2 tablets by mouth daily      Multiple Vitamins-Minerals (AIRBORNE GUMMIES PO) Take 3 chew tab by mouth daily      TURMERIC PO Take 2 tablets by mouth daily      vitamin C (ASCORBIC ACID) 250 MG tablet Take 250 mg by mouth daily      Vitamin D3 (CHOLECALCIFEROL) 125 MCG (5000 UT) tablet Take 125 mcg by mouth daily      albuterol (PROAIR HFA/PROVENTIL HFA/VENTOLIN HFA) 108 (90 Base) MCG/ACT inhaler Inhale 2 puffs into the lungs every 6 hours as needed for wheezing    Comments: Pharmacy may dispense brand covered by insurance (Proair, or proventil or ventolin or generic albuterol inhaler)      clobetasol (TEMOVATE) 0.05 % external cream Apply topically 2 times daily as needed      triamcinolone (KENALOG) 0.1 % external cream Apply topically 2 times daily as needed for irritation           Allergies   Allergies   Allergen Reactions     Morphine Anaphylaxis     Hives, throat swells and can't breath.     Data   Most Recent 3 CBC's:  Recent Labs   Lab Test 02/09/21  0825   WBC 5.7   HGB 13.0   MCV 89         Most Recent 3 BMP's:  Recent  Labs   Lab Test 02/09/21  0825      POTASSIUM 3.6   CHLORIDE 109   CO2 26   BUN 14   CR 0.80   ANIONGAP 4   IFEANYI 9.7   *     Most Recent 2 LFT's:No lab results found.  Most Recent INR's and Anticoagulation Dosing History:  Anticoagulation Dose History     There is no flowsheet data to display.        Most Recent 3 Troponin's:  Recent Labs   Lab Test 02/08/21  0428 02/07/21 2031   TROPI <0.015 <0.015     Most Recent Cholesterol Panel:  Recent Labs   Lab Test 02/07/21 2031   CHOL 300*   LDL Cannot estimate LDL when triglyceride exceeds 400 mg/dL  188*   HDL 54   TRIG 423*     Most Recent 6 Bacteria Isolates From Any Culture (See EPIC Reports for Culture Details):No lab results found.  Most Recent TSH, T4 and A1c Labs:  Recent Labs   Lab Test 02/07/21 2031   A1C 5.4     Results for orders placed or performed during the hospital encounter of 02/07/21   MRI Brain w & w/o contrast    Narrative    EXAM: MR BRAIN W/O AND W CONTRAST  LOCATION: [facility]  DATE/TIME: 2/7/2021 9:31 PM    INDICATION: Transient ischemic attack (TIA).  COMPARISON: CTA head and neck dated 02/07/2021.  CONTRAST: 10ml Gadavist.  TECHNIQUE: Routine multiplanar multisequence head MRI without and with intravenous contrast.    FINDINGS:  INTRACRANIAL CONTENTS: There is linear diffusion hyperintensity within one of the sulci along the lateral left frontal lobe, with associated susceptibility and FLAIR hyperintensity. This appears to correlate with an M4 branch occlusion on the CTA (series   14, image 48 of the CTA of the head). No definite parenchymal diffusion restriction although a small cortical component cannot be entirely excluded. Chronic lacunar infarcts in the bilateral basal ganglia. Small chronic infarct in the left cerebellum.   No acute intracranial hemorrhage or adverse intracranial mass effect. Patchy nonspecific T2/FLAIR hyperintensities within the cerebral white matter and ashley most consistent with mild to  moderate chronic microvascular ischemic change. Mild generalized   cerebral atrophy. No hydrocephalus. Normal position of the cerebellar tonsils. No pathologic contrast enhancement.    SELLA: No abnormality accounting for technique.    OSSEOUS STRUCTURES/SOFT TISSUES: Focal enhancement along the left frontal calvarium appears to correspond with a small intraosseous hemangioma. The proximal flow voids appear preserved.     ORBITS: No abnormality accounting for technique.     SINUSES/MASTOIDS: Mucosal thickening primarily involving the ethmoid air cells. No middle ear or mastoid effusion.       Impression    IMPRESSION:  1.  Linear hyperintensity within one of the sulci along the lateral left frontal lobe with associated FLAIR hyperintensity and susceptibility. This appears to correspond with a segmental M4 branch occlusion on the prior CTA. No definite parenchymal   diffusion restriction, however, a subtle cortical infarct cannot be entirely excluded.  2.  Age-related changes, with mild generalized parenchymal volume loss and mild to moderate sequelae of chronic microvascular ischemic disease. Chronic lacunar infarcts in the bilateral basal ganglia and left cerebellum.       Echocardiogram Complete - Bubble study    Narrative    [id number]  Formerly [facility]  [id number]  [id number]^SUSAN^LYDIA^E           [facility]  Echocardiography Laboratory  [address] [place] [address]        Name: DAWSON RODRIGUEZ  MRN: [id number]  : 1960  Study Date: 2021 10:49 AM  Age: 61 yrs  Gender: Female  Patient Location: [facility]  Reason For Study: CVI  Ordering Physician: LYDIA DAVIS  Referring Physician: DANIEL RODRIGUEZ  Performed By: Naomi Velasco     BSA: 2.0 m2  Height: 63 in  Weight: 225 lb  HR: 62  _____________________________________________________________________________  __        Procedure  Complete Portable Bubble Echo  Adult.  _____________________________________________________________________________  __        Interpretation Summary     The visual ejection fraction is estimated at 60-65%.  The right ventricle is normal in structure, function and size.  A contrast injection (Bubble Study) was performed that was negative for flow  across the interatrial septum.  The ascending aorta is Mildly dilated.  There is no comparison study available.  _____________________________________________________________________________  __        Left Ventricle  The left ventricle is normal in structure, function and size. There is normal  left ventricular wall thickness. Left ventricular systolic function is normal.  The visual ejection fraction is estimated at 60-65%. Left ventricular  diastolic function is normal. No regional wall motion abnormalities noted.     Right Ventricle  The right ventricle is normal in structure, function and size.     Atria  Normal left atrial size. Right atrial size is normal. A contrast injection  (Bubble Study) was performed that was negative for flow across the interatrial  septum.     Mitral Valve  There is mild mitral annular calcification. There is no mitral regurgitation  noted.        Tricuspid Valve  The tricuspid valve is normal in structure and function. There is trace  tricuspid regurgitation.     Aortic Valve  There is mild trileaflet aortic sclerosis. No aortic regurgitation is present.     Pulmonic Valve  The pulmonic valve is not well visualized. There is trace pulmonic valvular  regurgitation.     Vessels  Borderline aortic root dilatation. The ascending aorta is Mildly dilated. The  inferior vena cava is normal.     Pericardium  There is no pericardial effusion.        Rhythm  Sinus rhythm was noted.  _____________________________________________________________________________  __  MMode/2D Measurements & Calculations     IVSd: 1.1 cm  LVIDd: 4.2 cm  LVIDs: 2.9 cm  LVPWd: 1.2 cm  FS: 31.0 %  LV  mass(C)d: 166.4 grams  LV mass(C)dI: 81.9 grams/m2  Ao root diam: 3.6 cm  LA dimension: 4.1 cm  asc Aorta Diam: 3.8 cm  LA/Ao: 1.1  LA Volume (BP): 74.0 ml  LA Volume Index (BP): 36.5 ml/m2  RWT: 0.56           Doppler Measurements & Calculations  MV E max reginald: 93.1 cm/sec  MV A max reginald: 93.6 cm/sec  MV E/A: 0.99  MV dec slope: 439.0 cm/sec2  PA acc time: 0.12 sec  E/E' av.1  Lateral E/e': 8.3  Medial E/e': 15.9           _____________________________________________________________________________  __           Report approved by: Heather Martinez 2021 11:45 AM

## 2021-02-10 NOTE — PLAN OF CARE
Pt here with TIA. A&Ox4. Neuros intact. VSS. Tele NSR. Regular diet, thin liquids. Takes pills whole. Up independently. Denies pain. LP site CDI. Pt scoring green on the Aggression Stop Light Tool. Plan discharge to home tomorrow with Holter Monitor. Discharge tomorrow to home.    Pt's belongings: Belongings from admission day present in pt's room: cell phone, clothes, glasses, shoes, ring, watch        Home medications: Yes

## 2021-02-10 NOTE — PLAN OF CARE
Pt here with TIA. A&Ox4. Neuros intact. VSS on RA. Tele NSR. Regular diet, Thin liquids. Takes pills whole with water. Up IND. Ambulated in halls x1. Denies pain. Pt scoring green on the Aggression Stop Light tool. Plan for discharge home today.

## 2021-02-10 NOTE — UTILIZATION REVIEW
"  Admission Status; Secondary Review Determination         Under the authority of the Utilization Management Committee, the utilization review process indicated a secondary review on the above patient.  The review outcome is based on review of the medical records, discussions with staff, and applying clinical experience noted on the date of the review.        (x)      Inpatient Status Appropriate - This patient's medical care is consistent with medical management for inpatient care and reasonable inpatient medical practice.      () Observation Status Appropriate - This patient does not meet hospital inpatient criteria and is placed in observation status. If this patient's primary payer is Medicare and was admitted as an inpatient, Condition Code 44 should be used and patient status changed to \"observation\".   () Admission Status NOT Appropriate - This patient's medical care is not consistent with medical management for Inpatient or Observation Status.          RATIONALE FOR DETERMINATION     Lissette Wong is an 61 year old female who was admitted on 2/7/2021 with an episode of aphasia with reported left-sided facial weakness. Her head CT showed focal narrowing of the M2 segment of the MCA and TPA was going to be administered but then symptoms resolved.  MRI obtained which showed linear hyperintensity along the lateral left frontal lobe sculci consistent with M4 branch occlusion on CTA.  MRA showed severe stenosis of origins of superior and inferior division branches of left MCA.  Due to concern for vasculitis, LP was performed on 2/9/2021.  Given the additional findings on work up and need for patient to remain hospitalized to explore other etiologies, IP status appropriate for this admission.  I spoke with Dr. Austin who agrees.      The severity of illness, intensity of service provided, expected LOS and risk for adverse outcome make the care complex, high risk and appropriate for hospital admission.        The " information on this document is developed by the utilization review team in order for the business office to ensure compliance.  This only denotes the appropriateness of proper admission status and does not reflect the quality of care rendered.         The definitions of Inpatient Status and Observation Status used in making the determination above are those provided in the CMS Coverage Manual, Chapter 1 and Chapter 6, section 70.4.      Sincerely,     Jennifer Bustillo MD  Physician Advisor   Utilization Review/ Case Management  [facility].

## 2021-02-10 NOTE — PLAN OF CARE
Pt here with TIA. A&Ox4. Neuros intact. VSS. Tele NSR. Regulr diet, thin liquids. Takes pills whole. Up independent. Denies pain. Pt scoring green on the Aggression Stop Light Tool. Discharge to home with . Holter monitor in place. All belongings returned. Reviewed discharge instructions and medications.

## 2021-02-10 NOTE — DISCHARGE INSTRUCTIONS
Your risk factors for stroke or TIA (transient ischemic attack):    Your Risk Factors Your Results Normal Ranges   High blood pressure BP Readings from Last 1 Encounters:   02/10/21 132/74    Less than 120/80   Cholesterol              Total Lab Results   Component Value Date    CHOL 300 02/07/2021      Less than 150    Triglycerides   Lab Results   Component Value Date    TRIG 423 02/07/2021    Less than 150   LDL Lab Results   Component Value Date    LDL  02/07/2021     Cannot estimate LDL when triglyceride exceeds 400 mg/dL     02/07/2021       Less than 70   HDL Lab Results   Component Value Date    HDL 54 02/07/2021            Greater than 40 (men)  Greater than 50 (women)   Diabetes Recent Labs   Lab 02/09/21  0825   *    Fasting blood glucose    Smoking/tobacco use  Quit smoking and tobacco   Overweight  Lose 1-2 pounds a week   Lack of exercise  30 minutes moderate activity each day   Other risk factors include carotid (neck) artery disease, atrial fibrillation and stress. You may be on new medicine to treat high blood pressure, cholesterol, diabetes or atrial fibrillation.    Understanding Stroke Booklet given to patient. Please refer to booklet for further information.    Stroke warning signs and symptoms - CALL 911 right away for:  - Sudden numbness or weakness in the face, arm or leg (often on one side of the body).  - Sudden confusion or trouble understanding what is going on.  - Sudden blurred or decreased vision in one or both eyes.  - Sudden trouble speaking, loss of balance, dizziness or problems with coordination.  - Sudden, severe headache for no reason.  - Fainting or seizures.  - Symptoms may go away then come back suddenly.

## 2021-02-11 LAB
HSV1 DNA CSF QL NAA+PROBE: NOT DETECTED
HSV2 DNA CSF QL NAA+PROBE: NOT DETECTED
MICROBIOLOGIST REVIEW: NORMAL
SPECIMEN TYPE: NORMAL
VARICELLA ZOSTER DNA PCR COMMENT: NORMAL
VZV DNA SPEC QL NAA+PROBE: NORMAL

## 2021-02-11 NOTE — PROGRESS NOTES
"  [facility]    Stroke Progress Note    Interval Events  No new symptoms today. Feeling anxious  LP yesterday without complications - 1 WBC, protein normal (see end of note)    Impression  TIA vs MRI negative stroke, multifocal intracranial stenoses of unclear etiology. Most likely intracranial atherosclerosis (has high LDL + BP as risk factors); less likely vasculitis given benign LP, also less likely RCVS given findings on MRA    Plan  - ASA 81mg + Plavix 75mg x 90 days, then ASA 325mg daily indefinitely   - Treat LDL to goal 40-70 with Repatha- pt will discuss with PCP as outpatient. Intolerant to statins in the past  - Await CSF VZV and HSV PCR  - Long term tight BP control indicated, BP <130/80 after discharge (ok for permissive HTN SBP <220 goal in inpatient setting)  - Pt interested in mGLIDE trial, research coordinator will contact her (provides free BP & monitoring and more)  -30d cardiac rhythm monitor on discharge (ordered)  -PT/OT/ST      Follow up  - in 8 weeks with Dr. Julius Abdullahi of general neurology at [facility] ([place]) [phone]      Anh Barbosa PA-C  Neurology  02/10/2021 9:43 PM  To page me or covering stroke neurology team member, click here: AMCOM   Choose \"On Call\" tab at top, then search dropdown box for \"Neurology Adult\", select location, press Enter, then look for stroke/neuro ICU/telestroke.    ______________________________________________________    Medications     Scheduled Meds      Infusion Meds      PRN Meds         PHYSICAL EXAMINATION  Temp:  [98.1  F (36.7  C)-98.8  F (37.1  C)] 98.8  F (37.1  C)  Pulse:  [63-81] 81  Resp:  [16] 16  BP: (132-156)/(74-93) 156/93  SpO2:  [92 %-96 %] 92 %     Neurologic  Mental Status:  alert, oriented to situation, follows commands, speech clear and fluent  Cranial Nerves:  EOMI with normal smooth pursuit, facial movements symmetric, hearing not formally tested but intact to conversation, " no dysarthria  Motor:  moves all extremities spontaneously without any obvious hemiparesis  Reflexes:  deferred  Sensory: deferred  Coordination:  deferred, no obvious ataxia s  Station/Gait:  deferred    Imaging  I personally reviewed all imaging; relevant findings per HPI.     MRI BRAIN:  IMPRESSION: There is questionable vessel wall thickening and  enhancement at the midportion of the basilar artery at an area of  stenosis noted on the accompanying MRA. There is questionable vessel  wall thickening and enhancement at the left MCA trifurcation at an  area of vascular narrowing noted on the comparison MRA. No other  vascular wall thickening or enhancement is identified in the [place]; specifically, there is no definite evidence for vascular wall  thickening and enhancement of the right posterior cerebral artery  corresponding to subtle irregularity in contour of the P2 segment of  the right posterior cerebral artery on the accompanying MRA.    MRA head:  IMPRESSION:    1. Severe stenosis at the origins of the superior and inferior  division branches of the left middle cerebral artery again noted and  possibly atherosclerotic in nature.  2. Possible areas of atherosclerotic narrowing involving the right  posterior cerebral artery and basilar artery also again noted.  3. Otherwise, normal [place] MRA.      MRI BRAIN:  IMPRESSION:  1.  Linear hyperintensity within one of the sulci along the lateral left frontal lobe with associated FLAIR hyperintensity and susceptibility. This appears to correspond with a segmental M4 branch occlusion on the prior CTA. No definite parenchymal   diffusion restriction, however, a subtle cortical infarct cannot be entirely excluded.  2.  Age-related changes, with mild generalized parenchymal volume loss and mild to moderate sequelae of chronic microvascular ischemic disease. Chronic lacunar infarcts in the bilateral basal ganglia and left cerebellum.    Lab Results  Data   CBC  Recent Labs   Lab 02/09/21  0825   WBC 5.7   RBC 4.70   HGB 13.0   HCT 41.6        Basic Metabolic Panel    Recent Labs   Lab 02/09/21  0825      POTASSIUM 3.6   CHLORIDE 109   CO2 26   BUN 14   CR 0.80   *   IFEANYI 9.7     Liver Panel  No results for input(s): PROTTOTAL, ALBUMIN, BILITOTAL, ALKPHOS, AST, ALT, BILIDIRECT in the last 168 hours.  INR  No lab results found.   Lipid Profile    Recent Labs   Lab Test 02/07/21 2031   CHOL 300*   HDL 54   LDL Cannot estimate LDL when triglyceride exceeds 400 mg/dL  188*   TRIG 423*     A1C    Recent Labs   Lab Test 02/07/21 2031   A1C 5.4     LDL Cholesterol Calculated   Date Value Ref Range Status   02/07/2021  <100 mg/dL Final    Cannot estimate LDL when triglyceride exceeds 400 mg/dL     LDL Cholesterol Direct   Date Value Ref Range Status   02/07/2021 188 (H) <100 mg/dL Final     Comment:     Above desirable:  100-129 mg/dl  Borderline High:  130-159 mg/dL  High:             160-189 mg/dL  Very high:       >189 mg/dl           Troponin I    Recent Labs   Lab 02/08/21  0428 02/07/21 2031   TROPI <0.015 <0.015           CSF results:  Lab Results   Component Value Date    CRBC 0 02/09/2021    CRBC  02/09/2021     Test not performed. Criteria not met for second cell count.    CWBC 1 02/09/2021    CWBC  02/09/2021     Test not performed. Criteria not met for second cell count.    CTP 34 02/09/2021    CGLU 55 02/09/2021

## 2021-02-14 LAB
BACTERIA SPEC CULT: NO GROWTH
SPECIMEN SOURCE: NORMAL

## 2021-03-11 ENCOUNTER — RECORDS - HEALTHEAST (OUTPATIENT)
Dept: ADMINISTRATIVE | Facility: OTHER | Age: 61
End: 2021-03-11

## 2021-03-22 ENCOUNTER — COMMUNICATION - HEALTHEAST (OUTPATIENT)
Dept: INTERNAL MEDICINE | Facility: CLINIC | Age: 61
End: 2021-03-22

## 2021-03-22 DIAGNOSIS — I10 BENIGN ESSENTIAL HYPERTENSION: ICD-10-CM

## 2021-03-24 ENCOUNTER — COMMUNICATION - HEALTHEAST (OUTPATIENT)
Dept: FAMILY MEDICINE | Facility: CLINIC | Age: 61
End: 2021-03-24

## 2021-03-24 DIAGNOSIS — I10 HIGH BLOOD PRESSURE: ICD-10-CM

## 2021-04-09 ENCOUNTER — COMMUNICATION - HEALTHEAST (OUTPATIENT)
Dept: INTERNAL MEDICINE | Facility: CLINIC | Age: 61
End: 2021-04-09

## 2021-04-12 ENCOUNTER — MYC MEDICAL ADVICE (OUTPATIENT)
Dept: NEUROLOGY | Facility: CLINIC | Age: 61
End: 2021-04-12

## 2021-04-12 ENCOUNTER — COMMUNICATION - HEALTHEAST (OUTPATIENT)
Dept: LAB | Facility: CLINIC | Age: 61
End: 2021-04-12

## 2021-04-12 ENCOUNTER — AMBULATORY - HEALTHEAST (OUTPATIENT)
Dept: INTERNAL MEDICINE | Facility: CLINIC | Age: 61
End: 2021-04-12

## 2021-04-12 ENCOUNTER — AMBULATORY - HEALTHEAST (OUTPATIENT)
Dept: LAB | Facility: CLINIC | Age: 61
End: 2021-04-12

## 2021-04-12 DIAGNOSIS — J30.2 SEASONAL ALLERGIES: ICD-10-CM

## 2021-04-12 LAB
ANION GAP SERPL CALCULATED.3IONS-SCNC: 10 MMOL/L (ref 5–18)
BUN SERPL-MCNC: 12 MG/DL (ref 8–22)
CALCIUM SERPL-MCNC: 9.7 MG/DL (ref 8.5–10.5)
CHLORIDE BLD-SCNC: 107 MMOL/L (ref 98–107)
CO2 SERPL-SCNC: 25 MMOL/L (ref 22–31)
CREAT SERPL-MCNC: 0.75 MG/DL (ref 0.6–1.1)
GFR SERPL CREATININE-BSD FRML MDRD: >60 ML/MIN/1.73M2
GLUCOSE BLD-MCNC: 93 MG/DL (ref 70–125)
POTASSIUM BLD-SCNC: 4.3 MMOL/L (ref 3.5–5)
SODIUM SERPL-SCNC: 142 MMOL/L (ref 136–145)

## 2021-04-15 ENCOUNTER — VIRTUAL VISIT (OUTPATIENT)
Dept: NEUROLOGY | Facility: CLINIC | Age: 61
End: 2021-04-15
Attending: PHYSICIAN ASSISTANT
Payer: COMMERCIAL

## 2021-04-15 DIAGNOSIS — G45.9 TIA (TRANSIENT ISCHEMIC ATTACK): Primary | ICD-10-CM

## 2021-04-15 DIAGNOSIS — E66.01 CLASS 2 SEVERE OBESITY DUE TO EXCESS CALORIES WITH SERIOUS COMORBIDITY IN ADULT, UNSPECIFIED BMI (H): ICD-10-CM

## 2021-04-15 DIAGNOSIS — E78.5 HYPERLIPIDEMIA WITH TARGET LDL LESS THAN 70: ICD-10-CM

## 2021-04-15 DIAGNOSIS — E66.812 CLASS 2 SEVERE OBESITY DUE TO EXCESS CALORIES WITH SERIOUS COMORBIDITY IN ADULT, UNSPECIFIED BMI (H): ICD-10-CM

## 2021-04-15 DIAGNOSIS — I10 BENIGN ESSENTIAL HYPERTENSION: ICD-10-CM

## 2021-04-15 PROCEDURE — 99204 OFFICE O/P NEW MOD 45 MIN: CPT | Mod: 95 | Performed by: PSYCHIATRY & NEUROLOGY

## 2021-04-15 NOTE — NURSING NOTE
April 15, 2021 10:30 AM  Lissette Wong is a 61 year old female who is being evaluated via a billable telephone visit.      What phone number would you like to be contacted at? [phone]  How would you like to obtain your AVS? Justin Dolan MA

## 2021-04-15 NOTE — LETTER
4/15/2021         RE: Lissette Wong  [address], [place] [address]        Dear Colleague,    Thank you for referring your patient, Lissette Wong, to the [facility] [place]. Please see a copy of my visit note below.        [facility]    Lissette Wong MRN# [id number]   Age: 61 year old YOB: 1960     Requesting physician: Humberto Crowe            Assessment and Plan:   Assessment:  1.  TIA 2/7/21 - likely due to atherosclerotic vascular disease  2.  Stroke risk factors of hypertension (treated), obesity (BMI = 36), hyperlipidemia (untreated)     Plan:  1.  Continue ASA 325mg daily and discontinue the 81 mg ASA and Plavix at end of April  2.  Continue to monitor and record BP's on lisinopril  3.  Encouraged to begin medication for lowering very high cholesterol and triglyceride levels (she has not started the Zetia to date)  4.  Continue weight loss/diet/exercise top reduce BMI  5.  Postpone knee surgery until 9 months post-stroke for best outcome    Ms. Wong was encouraged to continue to monitor her stroke risk factors, particularly her blood pressure, weight, and lipids.  Although she prefers to manage her lipids with diet alone, I suggested that she give strong consideration to starting a lipid-lowering agent now and then at a later date when her weight was down in her diet was better established she could consider abandoning that to see if she was able to maintain lower lipid levels without medication.  We seek targets of blood pressure in the 130/70 range, total cholesterol less than 170, LDL less than 70, triglycerides less than 150, and BMI less than 25.  At this time she has achieved reasonable blood pressure control but not any of the other objectives.  She plans on discussing this further with her primary physician, Dr. Og at her next follow-up next week.  No additional neurological review is required at this time and she will call for further  concerns.               History of Present Illness:   CC:  A 1 hour telephone neurology consultation with Ms. Wong to review her recent hospital stay in early February of this year for what likely represented a TIA.  I had obtained to review her medical chart, her work-up, the consultations, laboratory studies and imaging with her today.    She presented with a mixed hemisphere TIA involving an expressive aphasia as well as a left-sided facial droop, recognized as stroke but resolving after her imaging studies and before our TPA could be administered.  Her hospital stay was otherwise benign.  She was found to have no cardiac rhythm abnormalities, a negative echo and bubble study, however had significant mid vessel stenosis involving the left middle cerebral and right posterior cerebral arteries.  It was presumed that this was atherosclerotic disease but work-up for vasculitis was commenced including blood studies and spinal fluid review all of which were negative for vasculitis.  She was found to have untreated hypertension as well as substantially elevated cholesterol and triglyceride levels as risk factors.  She has known obesity with a BMI of 36 and a strong family history of stroke and atherosclerotic disease as well as hyperlipidemia is in atrial fibrillation.  Once her work-up was completed she was discharged to outpatient follow-up and reports to me today that she suffers no neurological residuals or concerns of any nature.  She has subsequently undergone a 30-day rhythm monitoring which reflects a normal cardiac rhythm throughout and no evidence of any paroxysmal atrial fibrillation.    She has continued to take the lisinopril 10 mg daily and reports her blood pressures are running in the 110/70 range.  Although she was recommended to try Zetia, she has not been taking any lipid-lowering agents and instead has made effort to change to a Mediterranean diet and to exercise more.  She continues to take aspirin  81 mg daily and Plavix 75 mg daily but will be discontinuing both of those agents at the end of April and switching over to aspirin 325 mg daily.    She does not have any other vascular risk factors.  At present her neurologic review of systems is noncontributory.  She has questions about having knee surgery as she wishes to have preparation of her knee such that she can exercise and have better weight control.  Is recognized that the post stroke risk for major surgery extends out approximately 9 months and thus is advised to postpone her surgery until that date.    The balance of her medical review of systems family history, social history etc. as noted below.             Physical Exam:   Not performed via telephone visit.         Data:   All laboratory data reviewed  All imaging studies reviewed by me             DATA for DOCUMENTATION:         Past Medical History:     Patient Active Problem List   Diagnosis     TIA (transient ischemic attack)     No past medical history on file.    Also see scanned health assessment forms.       Past Surgical History:   No past surgical history on file.         Social History:     Social History     Socioeconomic History     Marital status:      Spouse name: Not on file     Number of children: Not on file     Years of education: Not on file     Highest education level: Not on file   Occupational History     Not on file   Social Needs     Financial resource strain: Not on file     Food insecurity     Worry: Not on file     Inability: Not on file     Transportation needs     Medical: Not on file     Non-medical: Not on file   Tobacco Use     Smoking status: Not on file   Substance and Sexual Activity     Alcohol use: Not on file     Drug use: Not on file     Sexual activity: Not on file   Lifestyle     Physical activity     Days per week: Not on file     Minutes per session: Not on file     Stress: Not on file   Relationships     Social connections     Talks on phone: Not  on file     Gets together: Not on file     Attends Latter-day service: Not on file     Active member of club or organization: Not on file     Attends meetings of clubs or organizations: Not on file     Relationship status: Not on file     Intimate partner violence     Fear of current or ex partner: Not on file     Emotionally abused: Not on file     Physically abused: Not on file     Forced sexual activity: Not on file   Other Topics Concern     Not on file   Social History Narrative     Not on file              Family History:   No family history on file.         Medications:     Current Outpatient Medications   Medication Sig     Acidophilus Lactobacillus CAPS Take 3 capsules by mouth At Bedtime [employer] Brand     albuterol (PROAIR HFA/PROVENTIL HFA/VENTOLIN HFA) 108 (90 Base) MCG/ACT inhaler Inhale 2 puffs into the lungs every 6 hours as needed for wheezing     aspirin (ASA) 81 MG EC tablet Take 1 tablet (81 mg) by mouth daily     clobetasol (TEMOVATE) 0.05 % external cream Apply topically 2 times daily as needed     clopidogrel (PLAVIX) 75 MG tablet Take 1 tablet (75 mg) by mouth daily     ezetimibe (ZETIA) 10 MG tablet Take 1 tablet (10 mg) by mouth At Bedtime     Glucosamine-Chondroitin 250-200 MG TABS Take 2 tablets by mouth daily     lisinopril (ZESTRIL) 10 MG tablet Take 1 tablet (10 mg) by mouth daily     Multiple Vitamins-Minerals (AIRBORNE GUMMIES PO) Take 3 chew tab by mouth daily     triamcinolone (KENALOG) 0.1 % external cream Apply topically 2 times daily as needed for irritation     TURMERIC PO Take 2 tablets by mouth daily     vitamin C (ASCORBIC ACID) 250 MG tablet Take 250 mg by mouth daily     Vitamin D3 (CHOLECALCIFEROL) 125 MCG (5000 UT) tablet Take 125 mcg by mouth daily     No current facility-administered medications for this visit.               Review of Systems:   A comprehensive 10 point review of systems (constitutional, ENT, cardiac, peripheral vascular, lymphatic, respiratory, GI,  , Musculoskeletal, skin, Neurological) was performed and found to be negative except as described in this note.     See intake form completed by patient        Again, thank you for allowing me to participate in the care of your patient.        Sincerely,        Julius Abdullahi MD, MD

## 2021-04-15 NOTE — PROGRESS NOTES
[facility]    Lissette Wong MRN# [id number]   Age: 61 year old YOB: 1960     Requesting physician: Humberto Crowe            Assessment and Plan:   Assessment:  1.  TIA 2/7/21 - likely due to atherosclerotic vascular disease  2.  Stroke risk factors of hypertension (treated), obesity (BMI = 36), hyperlipidemia (untreated)     Plan:  1.  Continue ASA 325mg daily and discontinue the 81 mg ASA and Plavix at end of April  2.  Continue to monitor and record BP's on lisinopril  3.  Encouraged to begin medication for lowering very high cholesterol and triglyceride levels (she has not started the Zetia to date)  4.  Continue weight loss/diet/exercise top reduce BMI  5.  Postpone knee surgery until 9 months post-stroke for best outcome    Ms. Wong was encouraged to continue to monitor her stroke risk factors, particularly her blood pressure, weight, and lipids.  Although she prefers to manage her lipids with diet alone, I suggested that she give strong consideration to starting a lipid-lowering agent now and then at a later date when her weight was down in her diet was better established she could consider abandoning that to see if she was able to maintain lower lipid levels without medication.  We seek targets of blood pressure in the 130/70 range, total cholesterol less than 170, LDL less than 70, triglycerides less than 150, and BMI less than 25.  At this time she has achieved reasonable blood pressure control but not any of the other objectives.  She plans on discussing this further with her primary physician, Dr. Og at her next follow-up next week.  No additional neurological review is required at this time and she will call for further concerns.               History of Present Illness:   CC:  A 1 hour telephone neurology consultation with Ms. Wong to review her recent hospital stay in early February of this year for what likely represented a TIA.  I had obtained to review her  medical chart, her work-up, the consultations, laboratory studies and imaging with her today.    She presented with a mixed hemisphere TIA involving an expressive aphasia as well as a left-sided facial droop, recognized as stroke but resolving after her imaging studies and before our TPA could be administered.  Her hospital stay was otherwise benign.  She was found to have no cardiac rhythm abnormalities, a negative echo and bubble study, however had significant mid vessel stenosis involving the left middle cerebral and right posterior cerebral arteries.  It was presumed that this was atherosclerotic disease but work-up for vasculitis was commenced including blood studies and spinal fluid review all of which were negative for vasculitis.  She was found to have untreated hypertension as well as substantially elevated cholesterol and triglyceride levels as risk factors.  She has known obesity with a BMI of 36 and a strong family history of stroke and atherosclerotic disease as well as hyperlipidemia is in atrial fibrillation.  Once her work-up was completed she was discharged to outpatient follow-up and reports to me today that she suffers no neurological residuals or concerns of any nature.  She has subsequently undergone a 30-day rhythm monitoring which reflects a normal cardiac rhythm throughout and no evidence of any paroxysmal atrial fibrillation.    She has continued to take the lisinopril 10 mg daily and reports her blood pressures are running in the 110/70 range.  Although she was recommended to try Zetia, she has not been taking any lipid-lowering agents and instead has made effort to change to a Mediterranean diet and to exercise more.  She continues to take aspirin 81 mg daily and Plavix 75 mg daily but will be discontinuing both of those agents at the end of April and switching over to aspirin 325 mg daily.    She does not have any other vascular risk factors.  At present her neurologic review of systems  is noncontributory.  She has questions about having knee surgery as she wishes to have preparation of her knee such that she can exercise and have better weight control.  Is recognized that the post stroke risk for major surgery extends out approximately 9 months and thus is advised to postpone her surgery until that date.    The balance of her medical review of systems family history, social history etc. as noted below.             Physical Exam:   Not performed via telephone visit.         Data:   All laboratory data reviewed  All imaging studies reviewed by me             DATA for DOCUMENTATION:         Past Medical History:     Patient Active Problem List   Diagnosis     TIA (transient ischemic attack)     No past medical history on file.    Also see scanned health assessment forms.       Past Surgical History:   No past surgical history on file.         Social History:     Social History     Socioeconomic History     Marital status:      Spouse name: Not on file     Number of children: Not on file     Years of education: Not on file     Highest education level: Not on file   Occupational History     Not on file   Social Needs     Financial resource strain: Not on file     Food insecurity     Worry: Not on file     Inability: Not on file     Transportation needs     Medical: Not on file     Non-medical: Not on file   Tobacco Use     Smoking status: Not on file   Substance and Sexual Activity     Alcohol use: Not on file     Drug use: Not on file     Sexual activity: Not on file   Lifestyle     Physical activity     Days per week: Not on file     Minutes per session: Not on file     Stress: Not on file   Relationships     Social connections     Talks on phone: Not on file     Gets together: Not on file     Attends Hinduism service: Not on file     Active member of club or organization: Not on file     Attends meetings of clubs or organizations: Not on file     Relationship status: Not on file     Intimate  partner violence     Fear of current or ex partner: Not on file     Emotionally abused: Not on file     Physically abused: Not on file     Forced sexual activity: Not on file   Other Topics Concern     Not on file   Social History Narrative     Not on file              Family History:   No family history on file.         Medications:     Current Outpatient Medications   Medication Sig     Acidophilus Lactobacillus CAPS Take 3 capsules by mouth At Bedtime [employer] Brand     albuterol (PROAIR HFA/PROVENTIL HFA/VENTOLIN HFA) 108 (90 Base) MCG/ACT inhaler Inhale 2 puffs into the lungs every 6 hours as needed for wheezing     aspirin (ASA) 81 MG EC tablet Take 1 tablet (81 mg) by mouth daily     clobetasol (TEMOVATE) 0.05 % external cream Apply topically 2 times daily as needed     clopidogrel (PLAVIX) 75 MG tablet Take 1 tablet (75 mg) by mouth daily     ezetimibe (ZETIA) 10 MG tablet Take 1 tablet (10 mg) by mouth At Bedtime     Glucosamine-Chondroitin 250-200 MG TABS Take 2 tablets by mouth daily     lisinopril (ZESTRIL) 10 MG tablet Take 1 tablet (10 mg) by mouth daily     Multiple Vitamins-Minerals (AIRBORNE GUMMIES PO) Take 3 chew tab by mouth daily     triamcinolone (KENALOG) 0.1 % external cream Apply topically 2 times daily as needed for irritation     TURMERIC PO Take 2 tablets by mouth daily     vitamin C (ASCORBIC ACID) 250 MG tablet Take 250 mg by mouth daily     Vitamin D3 (CHOLECALCIFEROL) 125 MCG (5000 UT) tablet Take 125 mcg by mouth daily     No current facility-administered medications for this visit.               Review of Systems:   A comprehensive 10 point review of systems (constitutional, ENT, cardiac, peripheral vascular, lymphatic, respiratory, GI, , Musculoskeletal, skin, Neurological) was performed and found to be negative except as described in this note.     See intake form completed by patient

## 2021-04-19 ENCOUNTER — OFFICE VISIT - HEALTHEAST (OUTPATIENT)
Dept: INTERNAL MEDICINE | Facility: CLINIC | Age: 61
End: 2021-04-19

## 2021-04-19 DIAGNOSIS — I10 BENIGN ESSENTIAL HYPERTENSION: ICD-10-CM

## 2021-04-19 DIAGNOSIS — E78.2 MIXED HYPERLIPIDEMIA: ICD-10-CM

## 2021-04-19 DIAGNOSIS — Z86.19 HISTORY OF COLD SORES: ICD-10-CM

## 2021-04-19 DIAGNOSIS — G45.9 TIA (TRANSIENT ISCHEMIC ATTACK): ICD-10-CM

## 2021-04-19 ASSESSMENT — MIFFLIN-ST. JEOR: SCORE: 1484.61

## 2021-05-01 ENCOUNTER — HEALTH MAINTENANCE LETTER (OUTPATIENT)
Age: 61
End: 2021-05-01

## 2021-05-19 ENCOUNTER — COMMUNICATION - HEALTHEAST (OUTPATIENT)
Dept: INTERNAL MEDICINE | Facility: CLINIC | Age: 61
End: 2021-05-19

## 2021-05-27 NOTE — PROGRESS NOTES
Clinic Note    Assessment:     Assessment and Plan:  1. Encounter for screening mammogram for breast cancer  She was told she needed a 3D mammogram ordered due to a rash on her left breast.  This was placed today.  - Mammo Screening Bilateral; Future    2. Skin rash  She has some evidence of dermatitis on her breast.  No evidence of inflammatory breast cancer as far as I can tell.  We will try triamcinolone.  - triamcinolone (KENALOG) 0.1 % cream; Apply to areas of irritation twice daily  Dispense: 30 g; Refill: 0    3. Ear fullness, right  She has some evidence of fluid behind her right TM.  I instructed her to use Flonase.  See patient instructions below.       Patient Instructions   Please call [phone] to schedule your 3D mammogram.    I sent in a steroid cream for your rash.  Apply this to your skin twice daily for the next week to 10 days.    You have some fluid in your left ear.  Use Flonase for this.  1 puff in each nostril once daily for the next month or so.    Send me a my chart message next week to let me know how you are doing with your rash.  If it is not getting much better, we can place a referral to dermatology.    Return for If symptoms do not start to improve in one week..         Subjective:      Patient comes to clinic today for a few different issues.    Breast cancer screening: She had an order for regular mammogram by her PCP.  When she went to schedule this, the  told her that she needed a 3D mammogram due to a small rash present on her left breast for the last 6 months or so.    Rash/skin lesion: She has a small hyperpigmented maculopapular rash on her left breast that is been present for the last 6 months or so.  It itches.  Does not hurt.  She has tried different creams and lotions.  Sometimes the rash seems to go away.  After hot shower, it seems slightly larger.    Ear fullness: She has some slightly decreased hearing in her left ear, as well as some ear fullness.  She saw  "her PCP recently who told her she had bubbles behind her eardrum.  He told her to use Flonase.  She picked this up but has not started using it.    The following portions of the patient's history were reviewed and updated as appropriate: Allergies, medications, problems, prior note.    Review of Systems:    Review is otherwise negative except for what is mentioned above.     Social Hx:    Social History     Tobacco Use   Smoking Status Never Smoker   Smokeless Tobacco Never Used         Objective:     Vitals:    04/16/19 1333   BP: 128/72   Patient Site: Right Arm   Patient Position: Sitting   Cuff Size: Adult Large   Pulse: 72   Weight: 197 lb 14.4 oz (89.8 kg)   Height: 5' 3.5\" (1.613 m)       Exam:    General: No apparent distress. Calm. Alert and Oriented X3. Pt behavior is appropriate.  Head:Atraumatic. Normocephalic, non-tender to palpation  Neck: Supple. No JVD. Full ROM. No adenopathy  Eyes: PERRL, No discharge. No strabismus. No nystagmus.  Ears: Small bubbles present behind right TM.  Ears otherwise normal.  Skin: There is a patch of hyperpigmented maculopapular discoloration on the patient's left breast at the 12 o'clock position      Patient Active Problem List   Diagnosis   (none) - all problems resolved or deleted     Current Outpatient Medications   Medication Sig Dispense Refill     albuterol (PROAIR HFA;PROVENTIL HFA;VENTOLIN HFA) 90 mcg/actuation inhaler Inhale 2 puffs every 6 (six) hours as needed for wheezing. 1 each 5     clobetasol (TEMOVATE) 0.05 % cream Use twice daily as needed 45 g 0     triamcinolone (KENALOG) 0.1 % cream Apply to areas of irritation twice daily 30 g 0     No current facility-administered medications for this visit.        I spent 25 minutes with patient face to face, of which >50% was counseling regarding the above plan       Yasir Jiménez CNP (Rob)    4/16/2019           "

## 2021-05-27 NOTE — PATIENT INSTRUCTIONS - HE
Please call [phone] to schedule your 3D mammogram.    I sent in a steroid cream for your rash.  Apply this to your skin twice daily for the next week to 10 days.    You have some fluid in your left ear.  Use Flonase for this.  1 puff in each nostril once daily for the next month or so.    Send me a my chart message next week to let me know how you are doing with your rash.  If it is not getting much better, we can place a referral to dermatology.

## 2021-05-27 NOTE — PROGRESS NOTES
Lissette comes in today for a physical exam as well as to establish care.  A complete review of systems is undertaken and was negative unless noted below.    ILLNESSES, HOSPITALIZATIONS, AND OPERATIONS:    #1.  Status post hysterectomy 1993.    2.  Status post cholecystectomy 1986.    3.  Status post bilateral hammertoe repair in 2018.    Lissette's past medical and surgical history reviewed.  Medications and allergies were inside old.  She is a Sh[employer]  at [employer] and [employer].  She is , has 4 children, and 10 grandchildren.    Aside from her physical exam she had a few complaints to address.  She is on Claritin for her allergies and states that this is not working well for her.  She is having some cough along with some postnasal drainage.  I recommended that she use Allegra along with a combination of Flonase as well.  She has a history of cold sores and likes to have a prescription for an antiviral on hand.  I told her I will call her in a prescription for Valtrex.  She has been having some coughing and some slight wheezing when she is running.  She feels that this is due to her allergies as well.  She has been dealing with incontinence for approximately last 2 years.  She gets the urge to go and has not much time before she needs to find a bathroom.  Has never been worked up in the past and she has not been on medication for this.  She is due for a mammogram as well.    OBJECTIVE:    In general the patient is alert pleasant and in no acute distress.    HEENT: Pupils equal round reactive light.  Oropharynx is clear.  Lymphatic shows no anterior posterior cervical lymphadenopathy.  No thyromegaly or other masses noted.    Cardiovascular: S1-S2 regular in rhythm no murmurs gallops rubs    Lungs are clear    Abdomen is soft nontender nondistended.  No HSM.    Extremities show no pedal edema present bilaterally.  DP pulses are 2+ and normal bilaterally.    Assessment and plan: Physical exam along  with the followin.  Exercise-induced bronchoconstriction.  I called her an albuterol inhaler to use.  Follow-up as needed.    2.  Seasonal allergies.  Again she will use a combination of Flonase and Allegra.    3.  Urge incontinence.  I am going to refer her to Dr. Angel at [facility] urology since this is been going on for such a long period of time.    4.  Healthcare maintenance.  Order for mammogram was placed.  Vaccinations are up-to-date.  Colonoscopy is up-to-date.  Check a lipid profile and a glucose.  Follow-up 1 year, earlier if needed.

## 2021-05-28 NOTE — TELEPHONE ENCOUNTER
Called and left message to call back. When patient calls back can schedule her 1st Shingrix vaccine on the [facility] schedule. Thanks.

## 2021-05-30 NOTE — TELEPHONE ENCOUNTER
RN cannot approve Refill Request    RN can NOT refill this medication med is not covered by policy/route to provider     . Last office visit: Visit date not found Last Physical: 4/5/2019 Last MTM visit: Visit date not found Last visit same specialty: 4/16/2019 Yasir Jiménez, DANIELE.  Next visit within 3 mo: Visit date not found  Next physical within 3 mo: Visit date not found      Zuleyma Summers, [facility] Triage/Med Refill 7/23/2019    Requested Prescriptions   Pending Prescriptions Disp Refills     clobetasol (TEMOVATE) 0.05 % cream [Pharmacy Med Name: CLOBETASOL PROP 0.05% CREAM 15GM] 45 g 0     Sig: APPLY EXTERNALLY TO THE AFFECTED AREA TWICE DAILY AS NEEDED       There is no refill protocol information for this order

## 2021-06-01 ENCOUNTER — RECORDS - HEALTHEAST (OUTPATIENT)
Dept: ADMINISTRATIVE | Facility: OTHER | Age: 61
End: 2021-06-01

## 2021-06-01 VITALS — HEIGHT: 64 IN | WEIGHT: 236 LBS | BODY MASS INDEX: 40.29 KG/M2

## 2021-06-01 VITALS — HEIGHT: 64 IN | WEIGHT: 236.8 LBS | BODY MASS INDEX: 40.43 KG/M2

## 2021-06-02 VITALS — HEIGHT: 64 IN | WEIGHT: 197.9 LBS | BODY MASS INDEX: 33.79 KG/M2

## 2021-06-02 VITALS — BODY MASS INDEX: 33.53 KG/M2 | WEIGHT: 196.4 LBS | HEIGHT: 64 IN

## 2021-06-05 VITALS
BODY MASS INDEX: 35.85 KG/M2 | DIASTOLIC BLOOD PRESSURE: 84 MMHG | WEIGHT: 210 LBS | SYSTOLIC BLOOD PRESSURE: 132 MMHG | HEIGHT: 64 IN | HEART RATE: 76 BPM

## 2021-06-14 ENCOUNTER — RECORDS - HEALTHEAST (OUTPATIENT)
Dept: ADMINISTRATIVE | Facility: OTHER | Age: 61
End: 2021-06-14

## 2021-06-16 NOTE — TELEPHONE ENCOUNTER
Patient has a future lab appointment today 4/12/21 for a BMP? . There are no lab orders could you review chart and place orders if necessary?Thank you.

## 2021-06-16 NOTE — TELEPHONE ENCOUNTER
I spoke to Lissette and explained that Dr. Poole is unable to take new patients at this time. She asked for referrals to  doctors and I gave her the names of Dahlgren Hank Dey and Yonatan.

## 2021-06-16 NOTE — TELEPHONE ENCOUNTER
RN cannot approve Refill Request    RN can NOT refill this medication PCP messaged that patient is overdue for Office Visit. Last office visit: Visit date not found Last Physical: 4/5/2019 Last MTM visit: Visit date not found Last visit same specialty: 4/16/2019 Yasir Jiménez CNP.  Next visit within 3 mo: Visit date not found  Next physical within 3 mo: Visit date not found      Gertrude Webber, [facility] Triage/Med Refill 3/22/2021    Requested Prescriptions   Pending Prescriptions Disp Refills     lisinopriL (PRINIVIL,ZESTRIL) 10 MG tablet [Pharmacy Med Name: LISINOPRIL 10MG TABS] 30 tablet 0     Sig: TAKE ONE TABLET BY MOUTH ONCE DAILY       [facility] Refill Protocol Failed - 3/22/2021  4:28 PM        Failed - PCP or prescribing provider visit in past 12 months       Last office visit with prescriber/PCP: Visit date not found OR same dept: Visit date not found OR same specialty: 4/16/2019 Yasir Jiménez CNP  Last physical: 4/5/2019 Last MTM visit: Visit date not found   Next visit within 3 mo: Visit date not found  Next physical within 3 mo: Visit date not found  Prescriber OR PCP: Humberto Og MD  Last diagnosis associated with med order: There are no diagnoses linked to this encounter.  If protocol passes may refill for 12 months if within 3 months of last provider visit (or a total of 15 months).             Passed - Serum Potassium in past 12 months     Lab Results   Component Value Date    Potassium 3.8 02/07/2021             Passed - Blood pressure filed in past 12 months     BP Readings from Last 1 Encounters:   02/07/21 (!) 186/97             Passed - Serum Creatinine in past 12 months     Creatinine   Date Value Ref Range Status   02/07/2021 0.83 0.60 - 1.10 mg/dL Final

## 2021-06-16 NOTE — TELEPHONE ENCOUNTER
New Appointment Needed  What is the reason for the visit:    Establish care, INF, [place] 2/7-2/11, TIA  Provider Preference: Dr Poole  How soon do you need to be seen?: when available, patient stated she was referred by her sister who is a patient of Dr Poole  Waitlist offered?: No  Okay to leave a detailed message:  Yes

## 2021-06-16 NOTE — TELEPHONE ENCOUNTER
Refill Request  Medication name: lisinopril 10 mg tabs   Requested Prescriptions      No prescriptions requested or ordered in this encounter     Who prescribed the medication: PCP  Last refill on medication: unknown not on med list   Requested Pharmacy: [facility]  Last appointment with PCP: 4/5/19  Next appointment: unknown    Mavis Henley RT (R)

## 2021-06-16 NOTE — PROGRESS NOTES
Assessment & Plan   Problem List Items Addressed This Visit     TIA (transient ischemic attack) - Primary    Mixed hyperlipidemia    Relevant Medications    fish oil-omega-3 fatty acids (FISH OIL) 300-1,000 mg capsule      Other Visit Diagnoses     Benign essential hypertension        Relevant Medications    lisinopriL (PRINIVIL,ZESTRIL) 10 MG tablet    History of cold sores        Relevant Medications    valACYclovir (VALTREX) 1000 MG tablet           #1.  Patient presenting today for hospital follow-up of a TIA that occurred in late January.  She has not had any neurologic symptoms since.  We spent the vast majority of the visit discussing the secondary prevention of stroke, including spending the vast majority of our time discussing treatment with statins.  She had a number of different questions in regards to statin therapy which I attempted to answer to the best of my ability.  We discussed studies that would indicate that statins absolutely prevent recurrence of stroke and decrease mortality in patients with known vascular disease.  We also discussed the risks of statins, including myalgias.  She is going to go home and think about whether she wants to do this.  I congratulated her on her weight loss and she is going to try to keep this up.  Blood pressure is under excellent control today and we will continue her lisinopril.  She is going to stop the Plavix at the end of the month and will just be on an aspirin.  She is going to be following up with me in the next month or 2 for a complete physical exam.    50 minutes were spent with the patient, over half of which was in counseling and coordination of care.          No follow-ups on file.    Humberto Og MD  [facility]   Lissette ESPARZA Marvin is 61 y.o. and presents today for the following health issues     Lissette comes in today for follow-up of a TIA that she suffered in late January.  She presented to an  "outside hospital with expressive aphasia and a left facial droop.  She is states that this resolved after she had imaging done but before they could administer TPA to her.  She was admitted to the hospital and underwent a echocardiogram which was negative, bubble study was negative.  A CT angio of the head found stenosis of the left middle cerebral arteries along with the right posterior cerebral arteries.  She had an event monitor placed which was negative for atrial fibrillation.  She was placed on Plavix and aspirin.  She was also placed on 10 mg of lisinopril for hypertension.  She was also counseled to go on a statin for her cholesterol, but was very fearful of this as she had a significant number of questions in regards to side effects.  She has not started any lipid-lowering therapy.    Currently Lissette is feeling well.  She has no persistent neurologic deficits.  She had many questions in regards to the risks and benefits of statin therapy and these were answered to the best of my ability.  We discussed the known literature in regards to statin and their benefits in regards to secondary prevention of vascular disease along with their mortality benefits.  She is concerned as her family has a history of hyperlipidemia and most everybody has had myalgias to statins.  She states that she also grew up in a household that was essentially anti-Western medicine, and apparently has issues with her family being very much against statins.      Objective    /84   Pulse 76   Ht 5' 3.5\" (1.613 m)   Wt 210 lb (95.3 kg)   BMI 36.62 kg/m    Body mass index is 36.62 kg/m .            "

## 2021-06-16 NOTE — TELEPHONE ENCOUNTER
Refill Request  Medication name:   Requested Prescriptions     Pending Prescriptions Disp Refills     lisinopriL (PRINIVIL,ZESTRIL) 10 MG tablet [Pharmacy Med Name: LISINOPRIL 10MG TABS] 30 tablet 0     Sig: TAKE ONE TABLET BY MOUTH ONCE DAILY     Who prescribed the medication: luis a  Last refill on medication: 2/8/2021  Requested Pharmacy: [facility]  Last appointment with PCP: 4/5/2019  Next appointment: None    Dorcas Forrester, CMA

## 2021-06-19 NOTE — PROGRESS NOTES
S: Patient was seen today at the [facility] in [place] with a prescription from Dr. Andrews for bilateral custom FOs. According to the patient, she had bilateral hammer toe correction surgery at the 2nd toe on 7/24/18 and is experiencing some surgical site discomfort. Patient states that she wore custom FOs in the past when she broke her LT foot. Also, patient states that she feels like she is walking on the balls of her feet and never lands on her heel.     O: Patient is 5'3 3/4'', weighs 220 lbs, and current shoe size is a woman's 8/8.5. Patient presents with bilateral tight plantar flexors, bilateral flexible forefoot, and 4 degrees of valgus hindfoot (bilateral, reducible) with moderate medial longitudinal arch collapse while weight bearing. Patient was alert/oriented during the appointment and not in distress. Patient ambulates without the use of ambulatory aids.    A: Impressions were taken with foam blocks as the patient was semi weight bearing. Valgus hindfoot alignment was reduced during the molding procedure. FOs will be fabricated by [employer] with top layer MERRITT, mid layer matte poron, and base layer rigid Nickelplast (MTPJs length). Metatarsal pads will be incorporated into the new FOs. Custom FOs are required due to patient's foot structure not being accommodated by an OTS FO, the need to reduce pressure at MTPJ, and degree of correction needed to reduce valgus hindfoot alignment.     G: Custom FOs will treat patient's current condition by providing support to the transverse/medial longitudinal arch and reducing valgus hindfoot alignment which will lead to joint stability, reduction in pain, and increase patient's ADLs.    P: Patient will return to the [facility] in [place] for fitting.    This note was electronically signed by Mick HASTINGS , ABC #[id number], License #[id number]

## 2021-06-19 NOTE — PROGRESS NOTES
Subjective findings: The patient presented to the clinic today for postop visit #3, 3 weeks status post hammertoe correction with extensor tenotomies second toe both feet.The patient complained of some calluses on the bottom of both feet and on the side of both great toes. .        Objective findings: The dressings were removed and the wounds are well healed.      There is no edema, erythema, cellulitis, drainage or bleeding noted.  Neurovascular status is intact.  Vital signs are stable.  Surgical correction is maintained.  There is some moderate flattening of the medial longitudinal arch noted bilaterally upon weightbearing.      Assessment: Pronation deformity bilateral feet       Plan: I have recommended orthotics.

## 2021-06-19 NOTE — ANESTHESIA POSTPROCEDURE EVALUATION
Patient: Lissette Wong  HAMMER TOE CORRECTION SECOND TOE BOTH FEET ,EXTENSOR TENOTOMY BOTH TOES  Anesthesia type: MAC    Patient location: Phase II Recovery  Last vitals:   Vitals:    07/24/18 1030   BP: 171/84   Pulse: 67   Resp:    Temp:    SpO2: 95%     Post vital signs: stable  Level of consciousness: awake and responds to simple questions  Post-anesthesia pain: pain controlled  Post-anesthesia nausea and vomiting: no  Pulmonary: unassisted, return to baseline  Cardiovascular: stable and blood pressure at baseline  Hydration: adequate  Anesthetic events: no    QCDR Measures:  ASA# 11 - Sherley-op Cardiac Arrest: ASA11B - Patient did NOT experience unanticipated cardiac arrest  ASA# 12 - Sherley-op Mortality Rate: ASA12B - Patient did NOT die  ASA# 13 - PACU Re-Intubation Rate: NA - No ETT / LMA used for case  ASA# 10 - Composite Anes Safety: ASA10A - No serious adverse event    Additional Notes:

## 2021-06-19 NOTE — PROGRESS NOTES
Preoperative Exam    Scheduled Procedure: Bilateral Hammer Toes Surgery  Surgery Date:  7/24/18  Surgery Location: [facility], fax [phone]    Surgeon:  Dr. Andrews    Assessment/Plan:     1. Preop general physical exam    She should do very well with the procedure and anesthesia.  Labs included an EKG in the note today, and she will follow-up here afterwards as needed.    - Electrocardiogram Perform and Read  - Hemoglobin    2. Hammer toes of both feet      3. Anxiety    She has a great deal of anxiety around seeing doctors in general, as evidenced by her initial high blood pressure reading here today which did come down.  She is requesting a few Ativan to get through the next week or so in preparation for the procedure.  He did oblige her on this and gave her a small dose that she is pretty naïve to these medications will follow-up with her as needed on that.    - LORazepam (ATIVAN) 0.5 MG tablet; Take 1 tablet (0.5 mg total) by mouth every 8 (eight) hours as needed for anxiety.  Dispense: 20 tablet; Refill: 0        Surgical Procedure Risk: Low (reported cardiac risk generally < 1%)  Have you had prior anesthesia?: Yes  Have you or any family members had a previous anesthesia reaction:  No  Do you or any family members have a history of a clotting or bleeding disorder?: No  Cardiac Risk Assessment: no increased risk for major cardiac complications    Patient approved for surgery with general or local anesthesia.      Functional Status: Independent  Patient plans to recover at home with family.     Subjective:      Lissette Wong is a 58 y.o. female who presents for a preoperative consultation.  She has hammertoes bilaterally and her second toes.  She has been doing this for about a year.  She is abstract has who suggested surgery for them to permanently fix them and she is very excited to have them done as they are quite painful.    Otherwise she is pretty healthy she gets a little anxiety coming  "to the doctor which is been lifelong for her.    She is a  and was up [place] for many years with a  reservation but now is moved back down to [facility].    All other systems reviewed and are negative, other than those listed in the HPI.    Pertinent History  Do you have difficulty breathing or chest pain after walking up a flight of stairs: No  History of obstructive sleep apnea: No  Steroid use in the last 6 months: No  Frequent Aspirin/NSAID use: Yes: Aleve 2 tabs daily for pain  Prior Blood Transfusion: Yes: 1986  Prior Blood Transfusion Reaction: No  If for some reason prior to, during or after the procedure, if it is medically indicated, would you be willing to have a blood transfusion?:  There is no transfusion refusal.    No current outpatient prescriptions on file.     No current facility-administered medications for this visit.         Allergies   Allergen Reactions     Morphine Anaphylaxis       There is no problem list on file for this patient.      No past medical history on file.    No past surgical history on file.    Social History     Social History     Marital status:      Spouse name: N/A     Number of children: N/A     Years of education: N/A     Occupational History     Not on file.     Social History Main Topics     Smoking status: Never Smoker     Smokeless tobacco: Never Used     Alcohol use Not on file     Drug use: Not on file     Sexual activity: Not on file     Other Topics Concern     Not on file     Social History Narrative     No narrative on file           Objective:     Vitals:    07/12/18 1403   BP: (!) 154/94   Pulse: 69   Temp: 97.9  F (36.6  C)   SpO2: 98%   Weight: (!) 236 lb 12.8 oz (107.4 kg)   Height: 5' 3.75\" (1.619 m)         Physical Exam:  General: Awake, Alert and Cooperative   Head: Normocephalic and Atraumatic   Eyes: PERRL, EOMI.   ENT: Normal pearly TMs bilaterally and Oropharynx clear   Neck: Supple and Thyroid without enlargement or " nodules   Chest: Chest wall normal   Lungs: Clear to auscultation bilaterally   Heart:: Regular rate and rhythm and no murmurs.  No significant LE edema.   Abdomen: Soft, nontender, nondistended and no hepatosplenomegaly   Musculoskeletal: Moving all extremities and No pain in the extremities   Neuro: Alert and oriented times 3 and Grossly normal   Skin: No rashes or lesions noted         There are no Patient Instructions on file for this visit.    EKG: Normal sinus rhythm with no hyper acute or acute changes.    Labs:  Labs pending at this time.  Results will be reviewed when available.      There is no immunization history on file for this patient.        Electronically signed by Humphrey Nunez MD 07/12/18 2:07 PM

## 2021-06-19 NOTE — PROGRESS NOTES
Subjective findings: The patient presented to the clinic today for postop visit #2, 2 weeks status post hammertoe correction with extensor tenotomies second toe both feet.The patient is in good spirits and she had no complaints.        Objective findings: The dressings were removed and the wounds are well healed.      There is no edema, erythema, cellulitis, drainage or bleeding noted.  Neurovascular status is intact.  Vital signs are stable.  Surgical correction is maintained.      Assessment:   Hammertoe and contracted extensor tendon second toe both feet      Plan:  All the sutures were removed today.  Pins were extremely loose and they were also removed today.  The patient was instructed to begin wearing normal shoe gear.  She is to return to the clinic as needed.   Postoperative x-rays of both feet were taken today.

## 2021-06-19 NOTE — PROGRESS NOTES
Subjective findings: The patient presented to the clinic today for postop visit #1, 1 week status post hammertoe correction with extensor tenotomies second toe both feet.The patient is in good spirits and she had no complaints.        Objective findings: The dressings were removed and the wounds are well coaptated and maintained.     There is no edema, erythema, cellulitis, drainage or bleeding noted.  Neurovascular status is intact.  Vital signs are stable.       Assessment:   Hammertoe and contracted extensor tendon second toe both feet      Plan: A sterile dressing was applied to both feet.  The patient was instructed to return to the clinic in 1 week at which time the sutures will be removed in a postop x-ray will be taken.

## 2021-06-19 NOTE — ANESTHESIA PREPROCEDURE EVALUATION
Anesthesia Evaluation      Patient summary reviewed     Airway   Mallampati: II  Neck ROM: full   Pulmonary - negative ROS and normal exam                          Cardiovascular   Exercise tolerance: > or = 4 METS  Rhythm: regular  Rate: normal,         Neuro/Psych    (+) anxiety/panic attacks,     Endo/Other    (+) obesity (BMI 40.84),      GI/Hepatic/Renal       Other findings: Obese      Dental - normal exam                        Anesthesia Plan  Planned anesthetic: MAC    ASA 2   Induction: intravenous   Anesthetic plan and risks discussed with: patient and spouse    Post-op plan: routine recovery

## 2021-06-19 NOTE — PROGRESS NOTES
Admission History & Physical  Lissette Wong, 1960, [id number]    [facility]  No Primary Care Provider, None    Extended Emergency Contact Information  Primary Emergency Contact: Nils Wong   [facility]  Home Phone: [phone]  Relation: Spouse     Assessment and Plan:   Assessment: Hammertoe second toe both feet, pronation deformity both feet  Plan: I have recommended hammertoe correction second toe both feet and I have also recommended orthotics.  Active Problems:    * No active hospital problems. *      Chief Complaint:  Painful hammertoe second toe both feet, pain in the bottom of both feet.     HPI:    Lissette Wong is a 58 y.o. old female who presented to the clinic today complaining of pain involving the second toe both feet.  She stated that the pain of the second toe left foot is more painful than the right foot.  She has had this problem for several months.  She likes to walk for exercise and she has not been able to do so causing some continued weight gain.  She denies any trauma to her feet.  She stated that several years ago she had a fracture of the fifth metatarsal left foot.  She was told that that fracture did not heal well.  She continues to have some mild pain on the outside of her left foot.  She has not had any recent redness or swelling involving her left foot.  Her pain is relieved with nonweightbearing.  History is provided by patient    Medical History  There are no active non-hospital problems to display for this patient.    History reviewed. No pertinent past medical history.  There are no active problems to display for this patient.    Surgical History  She  has no past surgical history on file.   History reviewed. No pertinent surgical history. Social History  Reviewed, and she  reports that she has never smoked. She has never used smokeless tobacco.  Social History   Substance Use Topics     Smoking status: Never Smoker     Smokeless tobacco: Never Used      Alcohol use Not on file      Allergies  No Known Allergies Family History  Reviewed, and family history is not on file.   Psychosocial Needs  Social History     Social History Narrative     No narrative on file     Additional psychosocial needs reviewed per nursing assessment.       Prior to Admission Medications     (Not in a hospital admission)        Review of Systems - Negative     BP (!) 138/96  Pulse 72  Temp 98.7  F (37.1  C) (Temporal)   SpO2 95%    Integument: Nails bilateral feet are  normal length and color.     Skin bilateral feet warm supple and intact.      Vascular: DP and PT pulses +2/4 bilateral feet.  Capillary refill less than 2 seconds bilateral feet.      Neurologic: Negative clonus, negative Babinski bilateral feet.      Musculoskeletal: Range of motion within normal limits bilateral feet.  Muscle power +5/5 bilaterally in all compartments.  There is a flexion deformity at the proximal interphalangeal joint of the second digit both feet.  There is no pain on palpation of the plantar lateral aspect left foot.  There is no edema or erythema noted bilateral feet.  There is mild flattening of the medial longitudinal arch noted bilaterally.      Assessment: Hammertoe second toe both feet, pronation deformity  both feet        Plan: An x-ray of both feet were taken today.  I informed the patient that she would require hammertoe correction of the second toe both feet to alleviate her pain.  She was told that the procedure done on an outpatient basis under local anesthesia with IV sedation.  She was told she would be discharged following procedure in a postop shoe for 3 weeks.  The patient was asked to go n.p.o. at midnight prior to the procedure and she was asked to see her primary care physician for preoperative consultation.  I have also recommended orthotics to control pronation.

## 2021-06-19 NOTE — ANESTHESIA CARE TRANSFER NOTE
Last vitals:   Vitals:    07/24/18 0946   BP: (!) 182/93   Pulse: 81   Resp: 16   Temp: 36.5  C (97.7  F)   SpO2: 97%     Patient's level of consciousness is drowsy  Spontaneous respirations: yes  Maintains airway independently: yes  Dentition unchanged: yes  Oropharynx: oropharynx clear of all foreign objects    QCDR Measures:  ASA# 20 - Surgical Safety Checklist: WHO surgical safety checklist completed prior to induction  PQRS# 430 - Adult PONV Prevention: 4558F - Pt received => 2 anti-emetic agents (different classes) preop & intraop  ASA# 8 - Peds PONV Prevention: NA - Not pediatric patient, not GA or 2 or more risk factors NOT present  PQRS# 424 - Sherley-op Temp Management: 4559F - At least one body temp DOCUMENTED => 35.5C or 95.9F within required timeframe  PQRS# 426 - PACU Transfer Protocol: - Transfer of care checklist used  ASA# 14 - Acute Post-op Pain: ASA14B - Patient did NOT experience pain >= 7 out of 10

## 2021-06-20 NOTE — PROGRESS NOTES
S: Patient was seen today at the [facility] in [place] for fitting of bilateral custom FOs with metatarsal pads fabricated by [employer].     O: Patient's condition has not changed since last visit.    A: Custom FOs were trimmed to accommodate contours and length of patient's current shoes. Patient was able to ambulate with the FOs on during the appointment without issue. FOs are comfortable for the patient to wear, alignment is appropriate, and there are no significant signs of pressure. Patient is satisfied with the fit and function of the FOs.     Patient was given verbal instructions on cleaning of the FOs, fitting issues, warranty issues, and who to contact if there is an issue.    P: Patient will go through a 5 day break in period which involves increasing the wear time in the shoes and FOs by 2 hours each day until on the 5th day the patient is wearing the items all day. Patient will follow up with the [place] clinic as needed.     This note has been electronically signed by Mick HASTINGS , ABC #[id number], License #[id number]

## 2021-06-26 NOTE — H&P (VIEW-ONLY)
[facility]  [address] [address]  Dept: [phone]  Dept Fax: [phone]  Primary Provider: Humberto Og MD  Pre-op Performing Provider: HUMBERTO OG      PREOPERATIVE EVALUATION:  Today's date: 6/24/2021    Lissette Wong is a 61 y.o. female who presents for a preoperative evaluation.    Surgical Information:  Surgery/Procedure: Right TKA  Surgery Location: [facility]  Surgeon: Dr. Grady  Surgery Date: 7/14/21  Where patient plans to recover: At home with family  Fax number for surgical facility: [phone]    Type of Anesthesia Anticipated: to be determined    Assessment & Plan     The proposed surgical procedure is considered INTERMEDIATE risk.    Problem List Items Addressed This Visit     TIA (transient ischemic attack)      Other Visit Diagnoses     Visit for screening mammogram    -  Primary    Relevant Orders    Mammo Screening Bilateral    Preop general physical exam        Relevant Orders    HM2(CBC w/o Differential)    Basic Metabolic Panel    Osteoarthritis of right knee, unspecified osteoarthritis type              RECOMMENDATION:  APPROVAL GIVEN to proceed with proposed procedure, without further diagnostic evaluation.  Hold lisinopril the morning of surgery.  Hold aspirin and NSAIDs 7 days prior to the surgery.  Hold all supplements 7 days prior to the surgery.            Subjective     HPI related to upcoming procedure: Lissette has end-stage osteoarthritis of the right knee and has failed nonoperative treatment.  She is going to undergo a right total knee arthroplasty for definitive treatment.  She is feeling well today and has no acute complaints.  She suffered a TIA in February of this year, and by the surgery date she will be 4-1/2months out from this.  She is having no residual symptoms.  She is having significant disability from her knee pain and I believe that the benefit of the surgery outweighs the potential risk of  TIA/stroke.    Preop Questions 6/23/2021   Have you ever had a heart attack or stroke? No   Have you ever had surgery on your heart or blood vessels, such as a stent placement, a coronary artery bypass, or surgery on an artery in your head, neck, heart, or legs? No   Do you have chest pain with activity? No   Do you have a history of  heart failure? No   Do you currently have a cold, bronchitis or symptoms of other infection? No   Do you have a cough, shortness of breath, or wheezing? No   Do you or anyone in your family have previous history of blood clots? No   Do you or does anyone in your family have a serious bleeding problem such as prolonged bleeding following surgeries or cuts? No   Have you ever had problems with anemia or been told to take iron pills? No   Have you had any abnormal blood loss such as black, tarry or bloody stools, or abnormal vaginal bleeding? No   Have you ever had a blood transfusion? YES -    Have you ever had a transfusion reaction? No   Are you willing to have a blood transfusion if it is medically needed before, during, or after your surgery? Yes   Have you or any of your relatives ever had problems with anesthesia? No   Do you have sleep apnea, excessive snoring or daytime drowsiness? No   Do you have any artifical heart valves or other implanted medical devices like a pacemaker, defibrillator, or continuous glucose monitor? No   Do you have artificial joints? No   Are you allergic to latex? No     Review of Systems  Constitutional, neuro, ENT, endocrine, pulmonary, cardiac, gastrointestinal, genitourinary, musculoskeletal, integument and psychiatric systems are negative, except as otherwise noted.      Patient Active Problem List    Diagnosis Date Noted     TIA (transient ischemic attack) 02/07/2021     Mixed hyperlipidemia 10/24/2019     Seasonal allergies 10/24/2019     Past Medical History:   Diagnosis Date     Migraines      Obesity      Past Surgical History:   Procedure  Laterality Date     CHOLECYSTECTOMY  1986     CORRECTION HAMMER TOE Bilateral 7/24/2018    Procedure: HAMMER TOE CORRECTION SECOND TOE BOTH FEET ,EXTENSOR TENOTOMY BOTH TOES;  Surgeon: Yasir Andrews DPM;  Location: [facility];  Service:      HYSTERECTOMY  1993     Current Outpatient Medications   Medication Sig Dispense Refill     albuterol (PROAIR HFA;PROVENTIL HFA;VENTOLIN HFA) 90 mcg/actuation inhaler Inhale 2 puffs every 6 (six) hours as needed for wheezing. 1 each 5     aspirin 81 MG EC tablet        cholecalciferol, vitamin D3, 5,000 unit Tab Take 5,000 Units by mouth daily.       clobetasol (TEMOVATE) 0.05 % cream APPLY EXTERNALLY TO THE AFFECTED AREA TWICE DAILY AS NEEDED 45 g 0     fexofenadine (ALLEGRA) 180 MG tablet        fish oil-omega-3 fatty acids (FISH OIL) 300-1,000 mg capsule        fluticasone propionate (FLONASE) 50 mcg/actuation nasal spray        glucosamine-chondroitin (OSTEO BI-FLEX) 250-200 mg Tab Take 2 tablets by mouth daily.       Lactobacillus acidophilus (PROBIOTIC ORAL) Take 3 tablets by mouth at bedtime. Mother Earth Brand       lisinopriL (PRINIVIL,ZESTRIL) 10 MG tablet Take 1 tablet (10 mg total) by mouth daily. 90 tablet 3     multivit-min/vit C/herb no.124 (AIRBORNE GUMMY ORAL) Take 3 tablets by mouth daily.       red yeast rice 600 mg Tab        triamcinolone (KENALOG) 0.1 % cream Apply 1 application topically 2 (two) times a day as needed.       TURMERIC ORAL Take 2 tablets by mouth daily.       clopidogreL (PLAVIX) 75 mg tablet Take 75 mg by mouth daily. Done on 4/30       lisinopriL (PRINIVIL,ZESTRIL) 10 MG tablet Take 1 tablet (10 mg total) by mouth daily. 30 tablet 0     No current facility-administered medications for this visit.        Allergies   Allergen Reactions     Morphine Anaphylaxis     Has tolerated hydrocodone and codeine in past       Social History     Tobacco Use     Smoking status: Never Smoker     Smokeless tobacco: Never Used   Substance Use  Topics     Alcohol use: Yes     Alcohol/week: 1.0 standard drinks     Types: 1 Shots of liquor per week      Family History   Problem Relation Age of Onset     Thyroid cancer Mother      Prostate cancer Father      Atrial fibrillation Father      Diabetes Maternal Grandfather      Heart disease Paternal Grandmother      Breast cancer Maternal Grandmother 70     Social History     Substance and Sexual Activity   Drug Use No        Objective     /78   Pulse 60   Wt 206 lb (93.4 kg)   BMI 35.92 kg/m    Physical Exam    GENERAL APPEARANCE: healthy, alert and no distress     EYES: EOMI, PERRL     HENT: ear canals and TM's normal and nose and mouth without ulcers or lesions     NECK: no adenopathy, no asymmetry, masses, or scars and thyroid normal to palpation     RESP: lungs clear to auscultation - no rales, rhonchi or wheezes     CV: regular rates and rhythm, normal S1 S2, no S3 or S4 and no murmur, click or rub     ABDOMEN:  soft, nontender, no HSM or masses and bowel sounds normal     MS: extremities normal- no gross deformities noted, no evidence of inflammation in joints, FROM in all extremities.     SKIN: no suspicious lesions or rashes     NEURO: Normal strength and tone, sensory exam grossly normal, mentation intact and speech normal     PSYCH: mentation appears normal. and affect normal/bright     LYMPHATICS: No cervical adenopathy    Recent Labs   Lab Test 04/12/21  1158 02/07/21  1502   HGB  --  12.9   PLT  --  286   INR  --  0.99    138   K 4.3 3.8   CREATININE 0.75 0.83        PRE-OP Diagnostics:   Check a BMP and CBC today.    EKG from February 2021 was reviewed.  Normal sinus rhythm, no ST or T wave changes.    REVISED CARDIAC RISK INDEX (RCRI)   The patient has the following serious cardiovascular risks for perioperative complications:   - Cerebrovascular Disease (TIA or CVA) = 1 point    RCRI INTERPRETATION: 1 point: Class II (low risk - 0.9% complication rate)         Signed  Electronically by: Humberto Og MD    Copy of this evaluation report is provided to requesting physician.

## 2021-06-27 DIAGNOSIS — Z11.59 ENCOUNTER FOR SCREENING FOR OTHER VIRAL DISEASES: ICD-10-CM

## 2021-07-09 ENCOUNTER — COMMUNICATION - HEALTHEAST (OUTPATIENT)
Dept: ADMINISTRATIVE | Facility: CLINIC | Age: 61
End: 2021-07-09

## 2021-07-09 NOTE — TELEPHONE ENCOUNTER
Telephone Encounter by Mark CHEW CMA at 7/9/2021  2:56 PM     Author: Mark CHEW CMA Service: -- Author Type: Certified Medical Assistant    Filed: 7/9/2021  2:56 PM Encounter Date: 7/9/2021 Status: Signed    : Mark CHEW CMA (Certified Medical Assistant)       Relayed MD message       Jaycob ORTA CMA

## 2021-07-09 NOTE — TELEPHONE ENCOUNTER
Telephone Encounter by Humberto Og MD at 7/9/2021  2:52 PM     Author: Humberto Og MD Service: -- Author Type: Physician    Filed: 7/9/2021  2:52 PM Encounter Date: 7/9/2021 Status: Signed    : Humberto Og MD (Physician)       Yes, she can

## 2021-07-09 NOTE — TELEPHONE ENCOUNTER
Telephone Encounter by Mark CHEW CMA at 7/9/2021  2:46 PM     Author: Mark CHEW CMA Service: -- Author Type: Certified Medical Assistant    Filed: 7/9/2021  2:48 PM Encounter Date: 7/9/2021 Status: Signed    : aMrk CHEW CMA (Certified Medical Assistant)       Spoke with patient and relayed MD message, however, patient would also like to know if she can take her probiotic as well.     Disp Refills Start End NAMRATA   Lactobacillus acidophilus (PROBIOTIC ORAL)     --   Sig - Route: Take 3 tablets by mouth at bedtime. Mother Earth Brand - Oral         Quinton ORTA CMA

## 2021-07-09 NOTE — TELEPHONE ENCOUNTER
Telephone Encounter by Goldberg, Laura L, RT (R) at 7/9/2021  2:04 PM     Author: Goldberg, Laura L, RT (VILMA) Service: -- Author Type: Radiologic Technologist    Filed: 7/9/2021  2:06 PM Encounter Date: 7/9/2021 Status: Signed    : Goldberg, Laura L, RT (R) (Radiologic Technologist)       Reason for Call:  Other    Detailed comments: Patient is scheduled for knee replacement on 07/14/2021 and was wondering if she can take tylenol and use lidocaine lotion for the pain?     Phone Number Patient can be reached at: Home number on file [phone] (home)    Best Time: any    Can we leave a detailed message on this number?: Yes    Call taken on 7/9/2021 at 2:04 PM by Laura L Goldberg

## 2021-07-09 NOTE — TELEPHONE ENCOUNTER
Telephone Encounter by Humberto Og MD at 7/9/2021  2:31 PM     Author: Humberto Og MD Service: -- Author Type: Physician    Filed: 7/9/2021  2:31 PM Encounter Date: 7/9/2021 Status: Signed    : Humberto Og MD (Physician)       Yes, she can

## 2021-07-12 DIAGNOSIS — Z11.59 ENCOUNTER FOR SCREENING FOR OTHER VIRAL DISEASES: ICD-10-CM

## 2021-07-12 PROCEDURE — U0005 INFEC AGEN DETEC AMPLI PROBE: HCPCS

## 2021-07-12 PROCEDURE — U0003 INFECTIOUS AGENT DETECTION BY NUCLEIC ACID (DNA OR RNA); SEVERE ACUTE RESPIRATORY SYNDROME CORONAVIRUS 2 (SARS-COV-2) (CORONAVIRUS DISEASE [COVID-19]), AMPLIFIED PROBE TECHNIQUE, MAKING USE OF HIGH THROUGHPUT TECHNOLOGIES AS DESCRIBED BY CMS-2020-01-R: HCPCS

## 2021-07-12 RX ORDER — FLUTICASONE PROPIONATE 50 MCG
1 SPRAY, SUSPENSION (ML) NASAL DAILY PRN
COMMUNITY
End: 2021-10-11

## 2021-07-12 RX ORDER — FEXOFENADINE HCL 180 MG/1
180 TABLET ORAL DAILY
COMMUNITY

## 2021-07-12 RX ORDER — CHLORAL HYDRATE 500 MG
2 CAPSULE ORAL DAILY
COMMUNITY

## 2021-07-12 RX ORDER — AMPICILLIN TRIHYDRATE 250 MG
600 CAPSULE ORAL DAILY
COMMUNITY

## 2021-07-12 RX ORDER — ACETAMINOPHEN 500 MG
1000 TABLET ORAL EVERY 6 HOURS PRN
COMMUNITY
End: 2021-10-11

## 2021-07-12 NOTE — PROVIDER NOTIFICATION
07/12/21 1314   Discharge Planning   Patient/Family Anticipates Transition to home with family   Concerns to be Addressed no discharge needs identified   Living Arrangements   People in home spouse   Type of Residence Private Residence   Is your private residence a single family home or apartment? Single family home   Number of Stairs, Within Home, Primary greater than 10 stairs   Stair Railings, Within Home, Primary railings safe and in good condition   Once home, are you able to live on one level? No   Which rooms are not on the main level? Bedroom   Bathroom Shower/Tub Walk-in shower   Equipment Currently Used at Home shower chair;grab bar, tub/shower;raised toilet seat   Support System   Support Systems Spouse/Significant Other   Do you have someone available to stay with you one or two nights once you are home? Yes   Medical Clearance   It is recommended that you call and check with any specialty providers before surgery to see if you need surgical clearance.  Do you see any specialty providers outside of your primary care provider?   (Neurologist at )   Blood   Known bleeding disorder or coagulopathy? No   Education   Patient attended total joint pre-op class/received pre-op teaching  email/phone call

## 2021-07-13 ENCOUNTER — TELEPHONE (OUTPATIENT)
Dept: INTERNAL MEDICINE | Facility: CLINIC | Age: 61
End: 2021-07-13

## 2021-07-13 LAB — SARS-COV-2 RNA RESP QL NAA+PROBE: NEGATIVE

## 2021-07-13 RX ORDER — CEFAZOLIN SODIUM 2 G/100ML
2 INJECTION, SOLUTION INTRAVENOUS SEE ADMIN INSTRUCTIONS
Status: DISCONTINUED | OUTPATIENT
Start: 2021-07-14 | End: 2021-07-14 | Stop reason: HOSPADM

## 2021-07-13 NOTE — TELEPHONE ENCOUNTER
General Call:     Who is calling:  Patient    Reason for Call:  Pt is scheduled for knee surgery tomorrow.  She developed a cold sore and is wanting to know if she can take her Valacyclovir.    Okay to leave a detailed message:Yes at Home number on file [phone] (home)

## 2021-07-14 ENCOUNTER — HOSPITAL ENCOUNTER (OUTPATIENT)
Facility: CLINIC | Age: 61
Discharge: HOME | End: 2021-07-15
Attending: ORTHOPAEDIC SURGERY | Admitting: ORTHOPAEDIC SURGERY
Payer: COMMERCIAL

## 2021-07-14 ENCOUNTER — ANESTHESIA (OUTPATIENT)
Dept: SURGERY | Facility: CLINIC | Age: 61
End: 2021-07-14
Payer: COMMERCIAL

## 2021-07-14 ENCOUNTER — ANESTHESIA EVENT (OUTPATIENT)
Dept: SURGERY | Facility: CLINIC | Age: 61
End: 2021-07-14
Payer: COMMERCIAL

## 2021-07-14 ENCOUNTER — HOSPITAL ENCOUNTER (OUTPATIENT)
Dept: RADIOLOGY | Facility: CLINIC | Age: 61
End: 2021-07-14
Attending: ORTHOPAEDIC SURGERY | Admitting: ORTHOPAEDIC SURGERY
Payer: COMMERCIAL

## 2021-07-14 DIAGNOSIS — Z96.652 HISTORY OF TOTAL KNEE ARTHROPLASTY, LEFT: Primary | ICD-10-CM

## 2021-07-14 PROBLEM — Z96.651 HISTORY OF TOTAL KNEE ARTHROPLASTY, RIGHT: Status: ACTIVE | Noted: 2021-07-14

## 2021-07-14 PROCEDURE — 370N000017 HC ANESTHESIA TECHNICAL FEE, PER MIN: Performed by: ORTHOPAEDIC SURGERY

## 2021-07-14 PROCEDURE — 258N000003 HC RX IP 258 OP 636: Performed by: ORTHOPAEDIC SURGERY

## 2021-07-14 PROCEDURE — 360N000077 HC SURGERY LEVEL 4, PER MIN: Performed by: ORTHOPAEDIC SURGERY

## 2021-07-14 PROCEDURE — 999N000065 XR KNEE PORT RIGHT 1/2 VIEWS: Mod: RT

## 2021-07-14 PROCEDURE — 250N000009 HC RX 250: Performed by: ORTHOPAEDIC SURGERY

## 2021-07-14 PROCEDURE — 250N000013 HC RX MED GY IP 250 OP 250 PS 637: Performed by: ORTHOPAEDIC SURGERY

## 2021-07-14 PROCEDURE — 250N000009 HC RX 250: Performed by: PHYSICIAN ASSISTANT

## 2021-07-14 PROCEDURE — 250N000011 HC RX IP 250 OP 636: Performed by: NURSE ANESTHETIST, CERTIFIED REGISTERED

## 2021-07-14 PROCEDURE — 250N000011 HC RX IP 250 OP 636: Performed by: ANESTHESIOLOGY

## 2021-07-14 PROCEDURE — 272N000001 HC OR GENERAL SUPPLY STERILE: Performed by: ORTHOPAEDIC SURGERY

## 2021-07-14 PROCEDURE — 258N000003 HC RX IP 258 OP 636: Performed by: NURSE ANESTHETIST, CERTIFIED REGISTERED

## 2021-07-14 PROCEDURE — C1776 JOINT DEVICE (IMPLANTABLE): HCPCS | Performed by: ORTHOPAEDIC SURGERY

## 2021-07-14 PROCEDURE — 250N000011 HC RX IP 250 OP 636: Performed by: ORTHOPAEDIC SURGERY

## 2021-07-14 PROCEDURE — 250N000011 HC RX IP 250 OP 636: Performed by: PHYSICIAN ASSISTANT

## 2021-07-14 PROCEDURE — 250N000009 HC RX 250: Performed by: ANESTHESIOLOGY

## 2021-07-14 PROCEDURE — 278N000051 HC OR IMPLANT GENERAL: Performed by: ORTHOPAEDIC SURGERY

## 2021-07-14 PROCEDURE — 250N000009 HC RX 250: Performed by: NURSE ANESTHETIST, CERTIFIED REGISTERED

## 2021-07-14 PROCEDURE — 999N000141 HC STATISTIC PRE-PROCEDURE NURSING ASSESSMENT: Performed by: ORTHOPAEDIC SURGERY

## 2021-07-14 PROCEDURE — 710N000010 HC RECOVERY PHASE 1, LEVEL 2, PER MIN: Performed by: ORTHOPAEDIC SURGERY

## 2021-07-14 PROCEDURE — 250N000013 HC RX MED GY IP 250 OP 250 PS 637: Performed by: PHYSICIAN ASSISTANT

## 2021-07-14 DEVICE — IMP TIBIAL ZIM PSN NP STM 5DEG SZ DR 42-5320-067-02: Type: IMPLANTABLE DEVICE | Site: KNEE | Status: FUNCTIONAL

## 2021-07-14 DEVICE — BONE CEMENT RADIOPAQUE SIMPLEX HV FULL DOSE 6194-1-001: Type: IMPLANTABLE DEVICE | Site: KNEE | Status: FUNCTIONAL

## 2021-07-14 DEVICE — IMPLANTABLE DEVICE: Type: IMPLANTABLE DEVICE | Site: KNEE | Status: FUNCTIONAL

## 2021-07-14 DEVICE — IMP PATELLA ZIM KNEE ALL POLLY 32MM 42-5400-000-32: Type: IMPLANTABLE DEVICE | Site: KNEE | Status: FUNCTIONAL

## 2021-07-14 RX ORDER — MAGNESIUM HYDROXIDE 1200 MG/15ML
LIQUID ORAL PRN
Status: DISCONTINUED | OUTPATIENT
Start: 2021-07-14 | End: 2021-07-14 | Stop reason: HOSPADM

## 2021-07-14 RX ORDER — NALOXONE HYDROCHLORIDE 0.4 MG/ML
0.4 INJECTION, SOLUTION INTRAMUSCULAR; INTRAVENOUS; SUBCUTANEOUS
Status: DISCONTINUED | OUTPATIENT
Start: 2021-07-14 | End: 2021-07-15 | Stop reason: HOSPADM

## 2021-07-14 RX ORDER — OXYCODONE HYDROCHLORIDE 5 MG/1
10 TABLET ORAL EVERY 4 HOURS PRN
Status: DISCONTINUED | OUTPATIENT
Start: 2021-07-14 | End: 2021-07-15 | Stop reason: HOSPADM

## 2021-07-14 RX ORDER — HYDROMORPHONE HCL IN WATER/PF 6 MG/30 ML
0.2 PATIENT CONTROLLED ANALGESIA SYRINGE INTRAVENOUS
Status: DISCONTINUED | OUTPATIENT
Start: 2021-07-14 | End: 2021-07-15 | Stop reason: CLARIF

## 2021-07-14 RX ORDER — FENTANYL CITRATE 50 UG/ML
50 INJECTION, SOLUTION INTRAMUSCULAR; INTRAVENOUS EVERY 5 MIN PRN
Status: DISCONTINUED | OUTPATIENT
Start: 2021-07-14 | End: 2021-07-14 | Stop reason: ALTCHOICE

## 2021-07-14 RX ORDER — LIDOCAINE 40 MG/G
CREAM TOPICAL
Status: DISCONTINUED | OUTPATIENT
Start: 2021-07-14 | End: 2021-07-15 | Stop reason: HOSPADM

## 2021-07-14 RX ORDER — HYDROMORPHONE HCL IN WATER/PF 6 MG/30 ML
0.2 PATIENT CONTROLLED ANALGESIA SYRINGE INTRAVENOUS EVERY 5 MIN PRN
Status: DISCONTINUED | OUTPATIENT
Start: 2021-07-14 | End: 2021-07-14 | Stop reason: ALTCHOICE

## 2021-07-14 RX ORDER — ACETAMINOPHEN 325 MG/1
650 TABLET ORAL EVERY 4 HOURS PRN
Status: DISCONTINUED | OUTPATIENT
Start: 2021-07-17 | End: 2021-07-15 | Stop reason: HOSPADM

## 2021-07-14 RX ORDER — BISACODYL 10 MG
10 SUPPOSITORY, RECTAL RECTAL DAILY PRN
Status: DISCONTINUED | OUTPATIENT
Start: 2021-07-14 | End: 2021-07-15 | Stop reason: HOSPADM

## 2021-07-14 RX ORDER — CEFAZOLIN SODIUM 2 G/100ML
2 INJECTION, SOLUTION INTRAVENOUS EVERY 8 HOURS
Status: COMPLETED | OUTPATIENT
Start: 2021-07-14 | End: 2021-07-15

## 2021-07-14 RX ORDER — LISINOPRIL 10 MG/1
10 TABLET ORAL DAILY
Status: DISCONTINUED | OUTPATIENT
Start: 2021-07-14 | End: 2021-07-15 | Stop reason: HOSPADM

## 2021-07-14 RX ORDER — PROPOFOL 10 MG/ML
INJECTION, EMULSION INTRAVENOUS CONTINUOUS PRN
Status: DISCONTINUED | OUTPATIENT
Start: 2021-07-14 | End: 2021-07-14

## 2021-07-14 RX ORDER — SODIUM CHLORIDE, SODIUM LACTATE, POTASSIUM CHLORIDE, CALCIUM CHLORIDE 600; 310; 30; 20 MG/100ML; MG/100ML; MG/100ML; MG/100ML
INJECTION, SOLUTION INTRAVENOUS CONTINUOUS
Status: DISCONTINUED | OUTPATIENT
Start: 2021-07-14 | End: 2021-07-15 | Stop reason: HOSPADM

## 2021-07-14 RX ORDER — FENTANYL CITRATE 50 UG/ML
50 INJECTION, SOLUTION INTRAMUSCULAR; INTRAVENOUS
Status: DISCONTINUED | OUTPATIENT
Start: 2021-07-14 | End: 2021-07-14

## 2021-07-14 RX ORDER — ONDANSETRON 2 MG/ML
4 INJECTION INTRAMUSCULAR; INTRAVENOUS EVERY 6 HOURS PRN
Status: DISCONTINUED | OUTPATIENT
Start: 2021-07-14 | End: 2021-07-15 | Stop reason: HOSPADM

## 2021-07-14 RX ORDER — BUPIVACAINE HYDROCHLORIDE AND EPINEPHRINE 5; 5 MG/ML; UG/ML
INJECTION, SOLUTION PERINEURAL
Status: COMPLETED | OUTPATIENT
Start: 2021-07-14 | End: 2021-07-14

## 2021-07-14 RX ORDER — CELECOXIB 100 MG/1
100 CAPSULE ORAL 2 TIMES DAILY
Status: DISCONTINUED | OUTPATIENT
Start: 2021-07-14 | End: 2021-07-15 | Stop reason: HOSPADM

## 2021-07-14 RX ORDER — ONDANSETRON 2 MG/ML
INJECTION INTRAMUSCULAR; INTRAVENOUS PRN
Status: DISCONTINUED | OUTPATIENT
Start: 2021-07-14 | End: 2021-07-14

## 2021-07-14 RX ORDER — POLYETHYLENE GLYCOL 3350 17 G/17G
17 POWDER, FOR SOLUTION ORAL DAILY
Status: DISCONTINUED | OUTPATIENT
Start: 2021-07-15 | End: 2021-07-15 | Stop reason: HOSPADM

## 2021-07-14 RX ORDER — ONDANSETRON 4 MG/1
4 TABLET, ORALLY DISINTEGRATING ORAL EVERY 6 HOURS PRN
Status: DISCONTINUED | OUTPATIENT
Start: 2021-07-14 | End: 2021-07-15 | Stop reason: HOSPADM

## 2021-07-14 RX ORDER — CLOBETASOL PROPIONATE 0.5 MG/G
CREAM TOPICAL 2 TIMES DAILY PRN
Status: DISCONTINUED | OUTPATIENT
Start: 2021-07-14 | End: 2021-07-15 | Stop reason: HOSPADM

## 2021-07-14 RX ORDER — AMOXICILLIN 250 MG
1 CAPSULE ORAL 2 TIMES DAILY
Status: DISCONTINUED | OUTPATIENT
Start: 2021-07-14 | End: 2021-07-15 | Stop reason: HOSPADM

## 2021-07-14 RX ORDER — PROCHLORPERAZINE MALEATE 10 MG
10 TABLET ORAL EVERY 6 HOURS PRN
Status: DISCONTINUED | OUTPATIENT
Start: 2021-07-14 | End: 2021-07-15 | Stop reason: HOSPADM

## 2021-07-14 RX ORDER — ONDANSETRON 2 MG/ML
4 INJECTION INTRAMUSCULAR; INTRAVENOUS EVERY 30 MIN PRN
Status: DISCONTINUED | OUTPATIENT
Start: 2021-07-14 | End: 2021-07-15 | Stop reason: HOSPADM

## 2021-07-14 RX ORDER — CEFAZOLIN SODIUM 2 G/100ML
2 INJECTION, SOLUTION INTRAVENOUS
Status: DISCONTINUED | OUTPATIENT
Start: 2021-07-14 | End: 2021-07-14 | Stop reason: HOSPADM

## 2021-07-14 RX ORDER — HYDROMORPHONE HCL IN WATER/PF 6 MG/30 ML
0.4 PATIENT CONTROLLED ANALGESIA SYRINGE INTRAVENOUS
Status: DISCONTINUED | OUTPATIENT
Start: 2021-07-14 | End: 2021-07-15 | Stop reason: CLARIF

## 2021-07-14 RX ORDER — ONDANSETRON 4 MG/1
4 TABLET, ORALLY DISINTEGRATING ORAL EVERY 30 MIN PRN
Status: DISCONTINUED | OUTPATIENT
Start: 2021-07-14 | End: 2021-07-15 | Stop reason: HOSPADM

## 2021-07-14 RX ORDER — BUPIVACAINE HYDROCHLORIDE 7.5 MG/ML
INJECTION, SOLUTION INTRASPINAL
Status: COMPLETED | OUTPATIENT
Start: 2021-07-14 | End: 2021-07-14

## 2021-07-14 RX ORDER — ACETAMINOPHEN 325 MG/1
975 TABLET ORAL EVERY 8 HOURS
Status: DISCONTINUED | OUTPATIENT
Start: 2021-07-14 | End: 2021-07-15 | Stop reason: HOSPADM

## 2021-07-14 RX ORDER — NALOXONE HYDROCHLORIDE 0.4 MG/ML
0.2 INJECTION, SOLUTION INTRAMUSCULAR; INTRAVENOUS; SUBCUTANEOUS
Status: DISCONTINUED | OUTPATIENT
Start: 2021-07-14 | End: 2021-07-15 | Stop reason: HOSPADM

## 2021-07-14 RX ORDER — FLUTICASONE PROPIONATE 50 MCG
1 SPRAY, SUSPENSION (ML) NASAL DAILY PRN
Status: DISCONTINUED | OUTPATIENT
Start: 2021-07-14 | End: 2021-07-15 | Stop reason: HOSPADM

## 2021-07-14 RX ORDER — MAGNESIUM SULFATE 4 G/50ML
4 INJECTION INTRAVENOUS ONCE
Status: COMPLETED | OUTPATIENT
Start: 2021-07-14 | End: 2021-07-14

## 2021-07-14 RX ORDER — SODIUM CHLORIDE, SODIUM LACTATE, POTASSIUM CHLORIDE, CALCIUM CHLORIDE 600; 310; 30; 20 MG/100ML; MG/100ML; MG/100ML; MG/100ML
INJECTION, SOLUTION INTRAVENOUS CONTINUOUS PRN
Status: DISCONTINUED | OUTPATIENT
Start: 2021-07-14 | End: 2021-07-14

## 2021-07-14 RX ORDER — TRANEXAMIC ACID 650 MG/1
1950 TABLET ORAL ONCE
Status: COMPLETED | OUTPATIENT
Start: 2021-07-14 | End: 2021-07-14

## 2021-07-14 RX ORDER — OXYCODONE HYDROCHLORIDE 5 MG/1
5 TABLET ORAL EVERY 4 HOURS PRN
Status: DISCONTINUED | OUTPATIENT
Start: 2021-07-14 | End: 2021-07-15 | Stop reason: HOSPADM

## 2021-07-14 RX ADMIN — OXYCODONE HYDROCHLORIDE 5 MG: 5 TABLET ORAL at 20:39

## 2021-07-14 RX ADMIN — SODIUM CHLORIDE, POTASSIUM CHLORIDE, SODIUM LACTATE AND CALCIUM CHLORIDE: 600; 310; 30; 20 INJECTION, SOLUTION INTRAVENOUS at 17:44

## 2021-07-14 RX ADMIN — TRANEXAMIC ACID 1950 MG: 650 TABLET ORAL at 10:41

## 2021-07-14 RX ADMIN — PROPOFOL 100 MCG/KG/MIN: 10 INJECTION, EMULSION INTRAVENOUS at 12:06

## 2021-07-14 RX ADMIN — LISINOPRIL 10 MG: 10 TABLET ORAL at 16:02

## 2021-07-14 RX ADMIN — ACETAMINOPHEN 975 MG: 325 TABLET, FILM COATED ORAL at 16:02

## 2021-07-14 RX ADMIN — DOCUSATE SODIUM 50MG AND SENNOSIDES 8.6MG 1 TABLET: 8.6; 5 TABLET, FILM COATED ORAL at 20:39

## 2021-07-14 RX ADMIN — MIDAZOLAM HYDROCHLORIDE 2 MG: 1 INJECTION, SOLUTION INTRAMUSCULAR; INTRAVENOUS at 11:28

## 2021-07-14 RX ADMIN — BUPIVACAINE HYDROCHLORIDE IN DEXTROSE 1.6 ML: 7.5 INJECTION, SOLUTION SUBARACHNOID at 12:10

## 2021-07-14 RX ADMIN — CELECOXIB 100 MG: 100 CAPSULE ORAL at 20:39

## 2021-07-14 RX ADMIN — ONDANSETRON 4 MG: 2 INJECTION INTRAMUSCULAR; INTRAVENOUS at 13:16

## 2021-07-14 RX ADMIN — CEFAZOLIN SODIUM 2 G: 2 INJECTION, SOLUTION INTRAVENOUS at 20:41

## 2021-07-14 RX ADMIN — ASPIRIN 325 MG: 325 TABLET, COATED ORAL at 16:02

## 2021-07-14 RX ADMIN — MAGNESIUM SULFATE IN WATER 4 G: 80 INJECTION, SOLUTION INTRAVENOUS at 11:50

## 2021-07-14 RX ADMIN — FENTANYL CITRATE 100 MCG: 50 INJECTION, SOLUTION INTRAMUSCULAR; INTRAVENOUS at 11:28

## 2021-07-14 RX ADMIN — SODIUM CHLORIDE, POTASSIUM CHLORIDE, SODIUM LACTATE AND CALCIUM CHLORIDE: 600; 310; 30; 20 INJECTION, SOLUTION INTRAVENOUS at 11:59

## 2021-07-14 RX ADMIN — CEFAZOLIN SODIUM 2 G: 2 INJECTION, SOLUTION INTRAVENOUS at 12:07

## 2021-07-14 RX ADMIN — BUPIVACAINE HYDROCHLORIDE AND EPINEPHRINE BITARTRATE 15 ML: 5; .005 INJECTION, SOLUTION PERINEURAL at 11:33

## 2021-07-14 ASSESSMENT — MIFFLIN-ST. JEOR: SCORE: 1467.38

## 2021-07-14 NOTE — ANESTHESIA PROCEDURE NOTES
Saphenous Procedure Note  Pre-Procedure   Staff -        Anesthesiologist:  Kb Rowe MD       Performed By: anesthesiologist       Location: pre-op       Procedure Start/Stop Times: 7/14/2021 11:31 AM and 7/14/2021 11:33 AM       Pre-Anesthestic Checklist: patient identified, IV checked, site marked, risks and benefits discussed, informed consent, monitors and equipment checked, pre-op evaluation, at physician/surgeon's request and post-op pain management  Timeout:       Correct Patient: Yes        Correct Procedure: Yes        Correct Site: Yes        Correct Position: Yes        Correct Laterality: Yes        Site Marked: Yes  Procedure Documentation  Procedure: Saphenous       Laterality: left       Patient Position: supine       Patient Prep/Sterile Barriers: sterile gloves, mask       Skin prep: Chloraprep       Needle Type: short bevel       Needle Gauge: 20.        Needle Length (Inches): 6        Ultrasound guided       1. Ultrasound was used to identify targeted nerve, plexus, vascular marker, or fascial plane and place a needle adjacent to it in real-time.       2. Ultrasound was used to visualize the spread of anesthetic in close proximity to the above referenced structure.       3. A permanent image is entered into the patient's record.       4. The visualized anatomic structures appeared normal.       5. There were no apparent abnormal pathologic findings.    Assessment/Narrative         The placement was negative for: blood aspirated, painful injection and site bleeding       Paresthesias: No.       Insertion/Infusion Method: Single Shot       Complications: none    Medication(s) Administered   Bupivacaine 0.5% w/ 1:200K Epi (Other), 15 mL  Medication Administration Time: 7/14/2021 11:33 AM

## 2021-07-14 NOTE — TREATMENT PLAN
Orthopedic Surgery Pre-Op Plan: Lissette Wong  pre-op review. This is NOT an H&P   Surgeon: Dr. Grady    Hospital: [facility]  Name of Surgery: Right Total Knee Arthroplasty   Date of Surgery: 7/14/21   H&P: Completed 6/24/21 by Dr. Humberto Og at [facility]  History of ASA, NSAIDS, vitamin and/or herbal supplements within 10 days: Yes- on ASA 81 daily for history of TIA in Feb 2021- instructed to hold this for 7 days before surgery by PCP. Fish Oil, Red Yeast Rice, Turmeric, Glucosamine- instructed to hold these meds/supplements for 7 days before surgery.   History of blood thinners: Yes- was on clopidogrel (Plavix) for about 2 months following TIA in Feb 2020. Plavix was discontinued at end of April 2021.     Plan:   1) Discharge Plan: Home with assist of Spouse. Please see Discharge Planning section near bottom of this note for further details.     2) Transient Ischemic Attack (TIA): Presented to [facility] ED 2/7/21 following sudden onset of aphasia and facial droop. Neurology was consulted and planned to give TPA however symptoms resolved before TPA was administered. CTA of head on 2/7/21 found stenosis of the left middle cerebral arteries along with the right posterior cerebral arteries. Was transferred to [facility] for further evaluation by Neurology. Echo with bubble study was negative. No remaining neurological deficits. On ASA 81 mg and Plavix following TIA. Given ok to stop Plavix at end of April 2021 and now just on ASA 81 mg daily which she was instructed to hold for 7 days before surgery. Would recommend resuming ASA as soon as safely able after surgery per Dr. Grady.     3) Hyperlipidemia: was advised to consider starting on statin therapy with history of TIA but is reluctant to do so.     4) Hypertension: appears well-controlled on lisinopril. Instructed to hold lisinopril on the morning of surgery.     Patient appears medically optimized for  upcoming surgery. I would recommend Hospitalist Consult to assist with medical management. Please call me below with any questions on this patient.       Review of Systems Notable for: TIA- Feb 2021, HLD, HTN.     Past Medical History:   Past Medical History:   Diagnosis Date     Antiplatelet or antithrombotic long-term use      Arthritis      Cerebral artery occlusion with cerebral infarction (H)     feb 21     Hyperlipidemia      Hypertension      Migraines      Motion sickness      Obesity      Overactive bladder      Subdural hemorrhage (H)      TIA (transient ischemic attack) 02/2021     Past Surgical History:   Procedure Laterality Date     CHOLECYSTECTOMY  1986     HYSTERECTOMY  1993     REPAIR HAMMER TOE Bilateral 7/24/2018    Procedure: HAMMER TOE CORRECTION SECOND TOE BOTH FEET ,EXTENSOR TENOTOMY BOTH TOES;  Surgeon: Yasir Andrews DPM;  Location: [facility];  Service:        Current Medications:  Patient's Medications   New Prescriptions    No medications on file   Previous Medications    ACETAMINOPHEN (TYLENOL) 500 MG TABLET    Take 1,000 mg by mouth every 6 hours as needed for pain    ACIDOPHILUS LACTOBACILLUS CAPS    Take 3 capsules by mouth At Bedtime Mother Earth Brand    ALBUTEROL (PROAIR HFA/PROVENTIL HFA/VENTOLIN HFA) 108 (90 BASE) MCG/ACT INHALER    Inhale 2 puffs into the lungs every 6 hours as needed for wheezing    ASPIRIN (ASA) 325 MG EC TABLET    Take 325 mg by mouth every 6 hours as needed for moderate pain LD 7/7    CLOBETASOL (TEMOVATE) 0.05 % EXTERNAL CREAM    Apply topically 2 times daily as needed    FEXOFENADINE (ALLEGRA) 180 MG TABLET    Take 180 mg by mouth daily Stopped 7/7/21    FISH OIL-OMEGA-3 FATTY ACIDS 1000 MG CAPSULE    Take 2 g by mouth daily Stopped 7/7/21    FLUTICASONE (FLONASE) 50 MCG/ACT NASAL SPRAY    Spray 1 spray into both nostrils daily as needed for rhinitis or allergies    GLUCOSAMINE-CHONDROITIN 250-200 MG TABS    Take 2 tablets by mouth daily Stopped  21    LISINOPRIL (ZESTRIL) 10 MG TABLET    Take 1 tablet (10 mg) by mouth daily    MULTIPLE VITAMINS-MINERALS (AIRBORNE GUMMIES PO)    Take 3 chew tab by mouth daily    RED YEAST RICE 600 MG CAPS    Take 600 mg by mouth daily Stopped 21    TRIAMCINOLONE (KENALOG) 0.1 % EXTERNAL CREAM    Apply topically 2 times daily as needed for irritation    TURMERIC PO    Take 2 tablets by mouth daily    VITAMIN C (ASCORBIC ACID) 250 MG TABLET    Take 250 mg by mouth daily    VITAMIN D3 (CHOLECALCIFEROL) 125 MCG (5000 UT) TABLET    Take 125 mcg by mouth daily   Modified Medications    No medications on file   Discontinued Medications    ASPIRIN (ASA) 81 MG EC TABLET    Take 1 tablet (81 mg) by mouth daily    CLOPIDOGREL (PLAVIX) 75 MG TABLET    Take 1 tablet (75 mg) by mouth daily    EZETIMIBE (ZETIA) 10 MG TABLET    Take 1 tablet (10 mg) by mouth At Bedtime       ALLERGIES:  Allergies   Allergen Reactions     Morphine Anaphylaxis     Hives, throat swells and can't breath.       Social History  Social History     Tobacco Use     Smoking status: Never Smoker     Smokeless tobacco: Never Used   Substance Use Topics     Alcohol use: Yes     Alcohol/week: 1.0 standard drinks     Comment: 1 per week     Drug use: No       Any Abnormal Recent Diagnostics? No      Discharge Plannin/12/21 1314   Discharge Planning   Patient/Family Anticipates Transition to home with family   Concerns to be Addressed no discharge needs identified   Living Arrangements   People in home spouse   Type of Residence Private Residence   Is your private residence a single family home or apartment? Single family home   Number of Stairs, Within Home, Primary greater than 10 stairs   Stair Railings, Within Home, Primary railings safe and in good condition   Once home, are you able to live on one level? No   Which rooms are not on the main level? Bedroom   Bathroom Shower/Tub Walk-in shower   Equipment Currently Used at Home shower chair;grab bar,  tub/shower;raised toilet seat   Support System   Support Systems Spouse/Significant Other   Do you have someone available to stay with you one or two nights once you are home? Yes   Medical Clearance   It is recommended that you call and check with any specialty providers before surgery to see if you need surgical clearance.  Do you see any specialty providers outside of your primary care provider?    (Neurologist at )   Blood   Known bleeding disorder or coagulopathy? No   Education   Patient attended total joint pre-op class/received pre-op teaching  email/phone call              KEVIN Mitchell, CNP   Advanced Practice Nurse Navigator- Orthopedics  [facility]   Phone: [phone]

## 2021-07-14 NOTE — BRIEF OP NOTE
[facility]    Brief Operative Note    Pre-operative diagnosis: OA (osteoarthritis) of knee [M17.10]  Post-operative diagnosis Same as pre-operative diagnosis    Procedure: Procedure(s):  RIGHT TOTAL KNEE ARTHROPLASTY  Surgeon: Surgeon(s) and Role:     * Kendell Grady MD - Primary     * Areli Ferrara PA-C - Assisting  Anesthesia: Spinal   Estimated blood loss: Less than 50 ml  Drains: None  Specimens: * No specimens in log *  Findings:   Grade 4 tricompartmental OA.  Complications: None.  Implants:   Implant Name Type Inv. Item Serial No.  Lot No. LRB No. Used Action   BONE CEMENT RADIOPAQUE SIMPLEX HV FULL DOSE 6194-1-001 - [id number] Cement, Bone BONE CEMENT RADIOPAQUE SIMPLEX HV FULL DOSE 6194-1-001  [employer] [id number] Right 1 Implanted   KNEE FEMUR CR CEMENT CCR STD SZ 7 R - [id number] Total Joint Component/Insert KNEE FEMUR CR CEMENT CCR STD SZ 7 R  [employer] [id number] Right 1 Implanted   IMP PATELLA ZIM KNEE ALL RUPA 32MM -872-32 - [id number] Total Joint Component/Insert IMP PATELLA ZIM KNEE ALL RUPA 32MM -023-32  [employer] [id number] Right 1 Implanted   IMP TIBIAL ZIM PSN NP STM 5DEG MARIS DOW -521-02 - [id number] Total Joint Component/Insert IMP TIBIAL ZIM PSN NP STM 5DEG MARIS DOW -477-02  [employer] [id number] Right 1 Implanted   VIVACIT E HIGHLY CROSSLINKED POLYETHYLENE ARTICULAR SURFACE MEDIAL CONGRUENT RIGHT 10 MM HEIGHT     [id number] Right 1 Implanted

## 2021-07-14 NOTE — ANESTHESIA PREPROCEDURE EVALUATION
Anesthesia Pre-Procedure Evaluation    Patient: Lissette Wong   MRN: [id number] : 1960        Preoperative Diagnosis: OA (osteoarthritis) of knee [M17.10]   Procedure : Procedure(s):  RIGHT TOTAL KNEE ARTHROPLASTY     Past Medical History:   Diagnosis Date     Antiplatelet or antithrombotic long-term use      Arthritis      Cerebral artery occlusion with cerebral infarction (H)          Hyperlipidemia      Hypertension      Migraines      Motion sickness      Obesity      Overactive bladder      Subdural hemorrhage (H)      TIA (transient ischemic attack) 2021      Past Surgical History:   Procedure Laterality Date     CHOLECYSTECTOMY       HYSTERECTOMY       REPAIR HAMMER TOE Bilateral 2018    Procedure: HAMMER TOE CORRECTION SECOND TOE BOTH FEET ,EXTENSOR TENOTOMY BOTH TOES;  Surgeon: Yasir Andrews DPM;  Location: [facility];  Service:       Allergies   Allergen Reactions     Morphine Anaphylaxis     Hives, throat swells and can't breath.      Social History     Tobacco Use     Smoking status: Never Smoker     Smokeless tobacco: Never Used   Substance Use Topics     Alcohol use: Yes     Alcohol/week: 1.0 standard drinks     Comment: 1 per week      Wt Readings from Last 1 Encounters:   21 93.5 kg (206 lb 3.2 oz)        Anesthesia Evaluation            ROS/MED HX  ENT/Pulmonary:       Neurologic:     (+) CVA, TIA,     Cardiovascular:     (+) hypertension-----Taking blood thinners     METS/Exercise Tolerance:     Hematologic:       Musculoskeletal:       GI/Hepatic:       Renal/Genitourinary:       Endo:     (+) Obesity,     Psychiatric/Substance Use:       Infectious Disease:       Malignancy:       Other:               OUTSIDE LABS:  CBC:   Lab Results   Component Value Date    WBC 5.7 2021    WBC 7.2 2021    HGB 13.0 2021    HGB 12.9 2021    HCT 41.6 2021    HCT 39.7 2021     2021     2021     BMP:   Lab  Results   Component Value Date     04/12/2021     02/09/2021    POTASSIUM 4.3 04/12/2021    POTASSIUM 3.6 02/09/2021    CHLORIDE 107 04/12/2021    CHLORIDE 109 02/09/2021    CO2 25 04/12/2021    CO2 26 02/09/2021    BUN 12 04/12/2021    BUN 14 02/09/2021    CR 0.75 04/12/2021    CR 0.80 02/09/2021    GLC 93 04/12/2021     (H) 02/09/2021     COAGS:   Lab Results   Component Value Date    PTT 27 02/07/2021    INR 0.99 02/07/2021     POC:   Lab Results   Component Value Date     (H) 02/08/2021     HEPATIC: No results found for: ALBUMIN, PROTTOTAL, ALT, AST, GGT, ALKPHOS, BILITOTAL, BILIDIRECT, HEATHER  OTHER:   Lab Results   Component Value Date    A1C 5.4 02/07/2021    IFEANYI 9.7 04/12/2021    MAG 1.8 02/07/2021    TSH 1.64 02/07/2021    CRP <2.9 02/09/2021    SED 16 02/09/2021       Anesthesia Plan    ASA Status:  3      Anesthesia Type: Spinal.              Consents            Postoperative Care    Pain management: IV analgesics, Oral pain medications, Multi-modal analgesia, Peripheral nerve block (Single Shot).   PONV prophylaxis: Dexamethasone or Solumedrol, Ondansetron (or other 5HT-3)     Comments:                Kb Rowe MD

## 2021-07-14 NOTE — ANESTHESIA POSTPROCEDURE EVALUATION
Patient: Lissette Wong    Procedure(s):  RIGHT TOTAL KNEE ARTHROPLASTY    Diagnosis:OA (osteoarthritis) of knee [M17.10]  Diagnosis Additional Information: No value filed.    Anesthesia Type:  Spinal    Note:  Disposition: Inpatient   Postop Pain Control: Uneventful            Sign Out: Well controlled pain   PONV: No   Neuro/Psych: Uneventful            Sign Out: Acceptable/Baseline neuro status   Airway/Respiratory: Uneventful            Sign Out: Acceptable/Baseline resp. status   CV/Hemodynamics: Uneventful            Sign Out: Acceptable CV status; No obvious hypovolemia; No obvious fluid overload   Other NRE: NONE   DID A NON-ROUTINE EVENT OCCUR? No           Last vitals:  Vitals:    07/14/21 1327 07/14/21 1330 07/14/21 1340   BP: 115/59 112/57 121/62   Pulse: 90 83 75   Resp: 16 17 13   Temp: 36.4  C (97.6  F)     SpO2: 96% 97% 97%       Last vitals prior to Anesthesia Care Transfer:  CRNA VITALS  7/14/2021 1254 - 7/14/2021 1354      7/14/2021             Pulse:  83    Ht Rate:  83    SpO2:  96 %          Electronically Signed By: Kb Rowe MD  July 14, 2021  2:01 PM

## 2021-07-14 NOTE — ANESTHESIA CARE TRANSFER NOTE
Patient: Lissette Wong    Procedure(s):  RIGHT TOTAL KNEE ARTHROPLASTY    Diagnosis: OA (osteoarthritis) of knee [M17.10]  Diagnosis Additional Information: No value filed.    Anesthesia Type:   Spinal     Note:    Oropharynx: oropharynx clear of all foreign objects  Level of Consciousness: awake  Oxygen Supplementation: face mask  Level of Supplemental Oxygen (L/min / FiO2): 8  Independent Airway: airway patency satisfactory and stable  Dentition: dentition unchanged  Vital Signs Stable: post-procedure vital signs reviewed and stable  Report to RN Given: handoff report given  Patient transferred to: PACU    Handoff Report: Identifed the Patient, Identified the Reponsible Provider, Reviewed the pertinent medical history, Discussed the surgical course, Reviewed Intra-OP anesthesia mangement and issues during anesthesia, Set expectations for post-procedure period and Allowed opportunity for questions and acknowledgement of understanding      Vitals: (Last set prior to Anesthesia Care Transfer)  CRNA VITALS  7/14/2021 1254 - 7/14/2021 1331      7/14/2021             Pulse:  83    Ht Rate:  83    SpO2:  96 %        Electronically Signed By: KEVIN CARMICHAEL CRNA  July 14, 2021  1:31 PM

## 2021-07-14 NOTE — ANESTHESIA PROCEDURE NOTES
Intrathecal injection Procedure Note  Pre-Procedure   Staff -        Anesthesiologist:  Kb Rowe MD       Performed By: anesthesiologist       Location: OR       Procedure Start/Stop Times: 7/14/2021 12:07 PM and 7/14/2021 12:00 PM       Pre-Anesthestic Checklist: patient identified, IV checked, risks and benefits discussed, informed consent, monitors and equipment checked, pre-op evaluation and at physician/surgeon's request  Timeout:       Correct Patient: Yes        Correct Procedure: Yes        Correct Site: Yes        Correct Position: Yes   Procedure Documentation  Procedure: intrathecal injection       Patient Position: sitting       Patient Prep/Sterile Barriers: sterile gloves, mask, patient draped       Skin prep: Chloraprep       Insertion Site: L3-4. (midline approach).       Needle Gauge: 24.        Needle Length (Inches): 4        Spinal Needle Type: Pencan       Introducer used       Introducer: 20 G       # of attempts: 1 and  # of redirects:  0    Assessment/Narrative         Paresthesias: No.       Sensory Level: T8       CSF fluid: clear.      Opening pressure was cmH2O while  Sitting.      Medication(s) Administered   0.75% Hyperbaric Bupivacaine (Intrathecal), 1.6 mL  Medication Administration Time: 7/14/2021 12:10 PM

## 2021-07-14 NOTE — PHARMACY-ADMISSION MEDICATION HISTORY
Pharmacy Note - Admission Medication History    Pertinent Provider Information:    ______________________________________________________________________    Prior To Admission (PTA) med list completed and updated in EMR.       Prior to Admission Medications   Prescriptions Last Dose Informant Patient Reported? Taking?   Acidophilus Lactobacillus CAPS 7/7/2021  Yes No   Sig: Take 3 capsules by mouth At Bedtime Mother Earth Brand   Glucosamine-Chondroitin 250-200 MG TABS 7/7/2021  Yes No   Sig: Take 2 tablets by mouth daily Stopped 7/7/21   Multiple Vitamins-Minerals (AIRBORNE GUMMIES PO) 7/7/2021  Yes No   Sig: Take 3 chew tab by mouth daily   TURMERIC PO 7/7/2021  Yes No   Sig: Take 2 tablets by mouth daily   Vitamin D3 (CHOLECALCIFEROL) 125 MCG (5000 UT) tablet 7/13/2021 at Unknown time  Yes Yes   Sig: Take 125 mcg by mouth daily   acetaminophen (TYLENOL) 500 MG tablet Past Week at Unknown time  Yes Yes   Sig: Take 1,000 mg by mouth every 6 hours as needed for pain   aspirin (ASA) 325 MG EC tablet 7/7/2021  Yes Yes   Sig: Take 325 mg by mouth every 6 hours as needed for moderate pain LD 7/7   clobetasol (TEMOVATE) 0.05 % external cream Past Week at not with  Yes Yes   Sig: Apply topically 2 times daily as needed   fexofenadine (ALLEGRA) 180 MG tablet 7/7/2021  Yes No   Sig: Take 180 mg by mouth daily Stopped 7/7/21   fish oil-omega-3 fatty acids 1000 MG capsule 7/7/2021  Yes No   Sig: Take 2 g by mouth daily Stopped 7/7/21   fluticasone (FLONASE) 50 MCG/ACT nasal spray Past Week at not with  Yes Yes   Sig: Spray 1 spray into both nostrils daily as needed for rhinitis or allergies   lisinopril (ZESTRIL) 10 MG tablet 7/13/2021 at Unknown time  No Yes   Sig: Take 1 tablet (10 mg) by mouth daily   red yeast rice 600 MG CAPS 7/7/2021  Yes No   Sig: Take 600 mg by mouth daily Stopped 7/7/21   vitamin C (ASCORBIC ACID) 250 MG tablet 7/7/2021  Yes No   Sig: Take 250 mg by mouth daily      Facility-Administered Medications:  None       Information source(s): Patient and CareEverywhere/SureScripts    Method of interview communication: in-person    Patient was asked about OTC/herbal products specifically.  PTA med list reflects this.    Based on the pharmacist's assessment, the PTA med list information appears reliable    Allergies were reviewed, assessed, and updated with the patient.      Patient did not bring any medications to the hospital and can't retrieve from home. No multi-dose medications are available for use during hospital stay.      Thank you for the opportunity to participate in the care of this patient.      Matthew Kirkpatrick [facility]     7/14/2021     11:04 AM

## 2021-07-14 NOTE — OP NOTE
Preoperative diagnosis: Right knee osteoarthrosis.     Postoperative diagnosis: Right knee osteoarthrosis end-stage    Procedure performed: Right total knee arthroplasty    Surgeon: Kendell Grady M.D.    First assistant: Areli Ferrara was used in this case to assist in retraction, bony cuts, trial implantation, and permanent implantation of total knee arthroplasty as well as wound closure and dressing placement.    Anesthesia: Spinal    Complications: None    Estimated blood loss: 50 mL    Findings: Grade 4 tricompartmental OA     Implants Used:     Emeka Persona size 7 right CR femur, size D right the stemmed tibia, 10mm CR polyethylene, 32mm patella.    Indications:    This patient has severe pain secondary to severe right knee osteoarthrosis.  This patient has failed or elected to forego nonoperative care including but not limited to physical therapy, injections, and pain control with medication.  This patient has significant pain affecting quality of life.  After understanding risks, benefits, alternatives, potential outcomes, the nature of the procedure, and rehabilitation the patient undergo right total knee arthroplasty.    Procedure:    The patient was identified in the preoperative holding area and the right knee was marked as the operative site.  The patient was brought to the operating suite where spinal anesthesia was provided by the anesthesia staff.  The right lower extremity was prepped and draped in the standard sterile fashion.  A timeout was performed.  The patient received antibiotics prior to incision.  We exsanguinated and elevated the thigh tourniquet to 300 mmHg.    A midline incision was made for a medial parapatellar approach.  The fat pad and ACL were excised.  An intramedullary guide was used to align the distal femoral cut at 5  valgus.  The 4-in-1 cutting block was sized and stabilized for the remainder of the distal femoral resections.  Next an extra  medullary tibial cutting guide was used to resect the proximal tibia in neutral alignment.  Posterior osteophytes and the menisci were excised.  We undercut the patella and reamed for the patella.  The above-noted implants were trialed.  The knee had excellent stability from 0-90  of knee flexion with range of motion 0 to 130.  The knee was stable varus valgus stress at 0 and 90 .  Equal flexion extension gaps.  Excellent tracking of the patella with the no hands test.    The trial implants were removed.  We reamed and broached for the femur and the tibia.  The bone was copiously irrigated and dried.  We cemented our implants and the place.  Cement was used.  We allowed the cement to dry and removed excess cement.  The tourniquet was deflated.  We copiously irrigated.  Hemostasis was achieved.  A layered closure was performed. Sterile dressings were placed.    The patient will be admitted to the hospital status post right total knee arthroplasty for postoperative pain control, DVT prophylaxis, physical therapy, and medical management.  The patient is weightbearing as tolerated.  The patient will be on ASA 325mg po daily x 6 weeks.  Estimated length of stay is 1 midnight.      Return to clinic in 2 weeks for reevaluation and x-rays.        Kendell Grady MD

## 2021-07-15 ENCOUNTER — APPOINTMENT (OUTPATIENT)
Dept: PHYSICAL THERAPY | Facility: CLINIC | Age: 61
End: 2021-07-15
Attending: ORTHOPAEDIC SURGERY
Payer: COMMERCIAL

## 2021-07-15 ENCOUNTER — APPOINTMENT (OUTPATIENT)
Dept: OCCUPATIONAL THERAPY | Facility: CLINIC | Age: 61
End: 2021-07-15
Attending: ORTHOPAEDIC SURGERY
Payer: COMMERCIAL

## 2021-07-15 VITALS
TEMPERATURE: 98 F | WEIGHT: 206.2 LBS | HEART RATE: 59 BPM | BODY MASS INDEX: 35.2 KG/M2 | DIASTOLIC BLOOD PRESSURE: 62 MMHG | SYSTOLIC BLOOD PRESSURE: 121 MMHG | OXYGEN SATURATION: 93 % | RESPIRATION RATE: 16 BRPM | HEIGHT: 64 IN

## 2021-07-15 LAB — HGB BLD-MCNC: 9.7 G/DL (ref 11.7–15.7)

## 2021-07-15 PROCEDURE — 250N000011 HC RX IP 250 OP 636: Performed by: ORTHOPAEDIC SURGERY

## 2021-07-15 PROCEDURE — 97116 GAIT TRAINING THERAPY: CPT | Mod: GP

## 2021-07-15 PROCEDURE — 97110 THERAPEUTIC EXERCISES: CPT | Mod: GP

## 2021-07-15 PROCEDURE — 250N000013 HC RX MED GY IP 250 OP 250 PS 637: Performed by: ORTHOPAEDIC SURGERY

## 2021-07-15 PROCEDURE — 85018 HEMOGLOBIN: CPT | Performed by: ORTHOPAEDIC SURGERY

## 2021-07-15 PROCEDURE — 97161 PT EVAL LOW COMPLEX 20 MIN: CPT | Mod: GP

## 2021-07-15 PROCEDURE — 36415 COLL VENOUS BLD VENIPUNCTURE: CPT | Performed by: ORTHOPAEDIC SURGERY

## 2021-07-15 PROCEDURE — 97535 SELF CARE MNGMENT TRAINING: CPT | Mod: GO

## 2021-07-15 PROCEDURE — 97166 OT EVAL MOD COMPLEX 45 MIN: CPT | Mod: GO

## 2021-07-15 RX ORDER — AMOXICILLIN 250 MG
1 CAPSULE ORAL DAILY PRN
Refills: 0 | COMMUNITY
Start: 2021-07-15 | End: 2021-10-11

## 2021-07-15 RX ORDER — ONDANSETRON 4 MG/1
4 TABLET, ORALLY DISINTEGRATING ORAL EVERY 6 HOURS PRN
Qty: 4 TABLET | Refills: 0 | Status: SHIPPED | OUTPATIENT
Start: 2021-07-15 | End: 2021-10-11

## 2021-07-15 RX ORDER — CELECOXIB 100 MG/1
100 CAPSULE ORAL 2 TIMES DAILY
Qty: 30 CAPSULE | Refills: 0 | Status: SHIPPED | OUTPATIENT
Start: 2021-07-15 | End: 2021-10-11

## 2021-07-15 RX ORDER — HYDROXYZINE HYDROCHLORIDE 25 MG/1
25 TABLET, FILM COATED ORAL EVERY 6 HOURS PRN
Qty: 30 TABLET | Refills: 0 | Status: SHIPPED | OUTPATIENT
Start: 2021-07-15 | End: 2021-10-11

## 2021-07-15 RX ORDER — ACETAMINOPHEN 500 MG
500-1000 TABLET ORAL EVERY 6 HOURS
COMMUNITY
Start: 2021-07-15

## 2021-07-15 RX ORDER — OXYCODONE HYDROCHLORIDE 5 MG/1
5-10 TABLET ORAL EVERY 4 HOURS PRN
Qty: 25 TABLET | Refills: 0 | Status: SHIPPED | OUTPATIENT
Start: 2021-07-15 | End: 2021-10-11

## 2021-07-15 RX ADMIN — DOCUSATE SODIUM 50MG AND SENNOSIDES 8.6MG 1 TABLET: 8.6; 5 TABLET, FILM COATED ORAL at 09:23

## 2021-07-15 RX ADMIN — ACETAMINOPHEN 975 MG: 325 TABLET, FILM COATED ORAL at 06:53

## 2021-07-15 RX ADMIN — OXYCODONE HYDROCHLORIDE 10 MG: 5 TABLET ORAL at 09:23

## 2021-07-15 RX ADMIN — CELECOXIB 100 MG: 100 CAPSULE ORAL at 09:24

## 2021-07-15 RX ADMIN — OXYCODONE HYDROCHLORIDE 5 MG: 5 TABLET ORAL at 05:32

## 2021-07-15 RX ADMIN — POLYETHYLENE GLYCOL 3350 17 G: 17 POWDER, FOR SOLUTION ORAL at 09:24

## 2021-07-15 RX ADMIN — CEFAZOLIN SODIUM 2 G: 2 INJECTION, SOLUTION INTRAVENOUS at 04:17

## 2021-07-15 RX ADMIN — ACETAMINOPHEN 975 MG: 325 TABLET, FILM COATED ORAL at 00:06

## 2021-07-15 RX ADMIN — OXYCODONE HYDROCHLORIDE 5 MG: 5 TABLET ORAL at 01:12

## 2021-07-15 RX ADMIN — ASPIRIN 325 MG: 325 TABLET, COATED ORAL at 09:24

## 2021-07-15 NOTE — DISCHARGE SUMMARY
Physician Discharge Summary     Patient ID:  Lissette Wong  [id number]  61 year old  1960    Admit date: 7/14/2021    Discharge date and time: 7/15/2021     Admitting Physician: Kendell Grady MD     Discharge Physician: Otis De Leon CNP under direction of RUBÉN Grady    Admission Diagnoses: OA (osteoarthritis) of knee [M17.10]  History of total knee arthroplasty, right [Z96.651]    Discharge Diagnoses: s/p TKA    Admission Condition: good    Discharged Condition: good    Indication for Admission: DVT prophylaxis, pain management, PT/OT    Hospital Course: Patient has had 2 episodes of becoming clammy, dizzy and mildly naueseated. Tolerated and passed therapies well.     Consults: none    Disposition: home    Patient Instructions:   Current Discharge Medication List      START taking these medications    Details   !! acetaminophen (TYLENOL) 500 MG tablet Take 1-2 tablets (500-1,000 mg) by mouth every 6 hours      celecoxib (CELEBREX) 100 MG capsule Take 1 capsule (100 mg) by mouth 2 times daily for 15 days  Qty: 30 capsule, Refills: 0      hydrOXYzine (ATARAX) 25 MG tablet Take 1 tablet (25 mg) by mouth every 6 hours as needed for itching or anxiety (with pain, moderate pain)  Qty: 30 tablet, Refills: 0      ondansetron (ZOFRAN-ODT) 4 MG ODT tab Take 1 tablet (4 mg) by mouth every 6 hours as needed for nausea or vomiting  Qty: 4 tablet, Refills: 0      oxyCODONE (ROXICODONE) 5 MG tablet Take 1-2 tablets (5-10 mg) by mouth every 4 hours as needed for breakthrough pain or moderate to severe pain Do not exceed 7 tablets per day.  Qty: 25 tablet, Refills: 0      senna-docusate (SENOKOT-S/PERICOLACE) 8.6-50 MG tablet Take 1 tablet by mouth daily as needed for constipation  Refills: 0       !! - Potential duplicate medications found. Please discuss with provider.      CONTINUE these medications which have CHANGED    Details   aspirin (ASA) 325 MG EC tablet Take 1 tablet (325 mg) by mouth daily  Qty: 42  tablet, Refills: 0    Comments: For Blood clot prevention.         CONTINUE these medications which have NOT CHANGED    Details   !! acetaminophen (TYLENOL) 500 MG tablet Take 1,000 mg by mouth every 6 hours as needed for pain      clobetasol (TEMOVATE) 0.05 % external cream Apply topically 2 times daily as needed      fluticasone (FLONASE) 50 MCG/ACT nasal spray Spray 1 spray into both nostrils daily as needed for rhinitis or allergies      lisinopril (ZESTRIL) 10 MG tablet Take 1 tablet (10 mg) by mouth daily  Qty: 30 tablet, Refills: 0    Comments: Future refills by PCP Dr. Reba Ly with phone number None.  Associated Diagnoses: TIA (transient ischemic attack)      Vitamin D3 (CHOLECALCIFEROL) 125 MCG (5000 UT) tablet Take 125 mcg by mouth daily      Acidophilus Lactobacillus CAPS Take 3 capsules by mouth At Bedtime Mother [employer] Brand      fexofenadine (ALLEGRA) 180 MG tablet Take 180 mg by mouth daily Stopped 7/7/21      fish oil-omega-3 fatty acids 1000 MG capsule Take 2 g by mouth daily Stopped 7/7/21      Glucosamine-Chondroitin 250-200 MG TABS Take 2 tablets by mouth daily Stopped 7/7/21      Multiple Vitamins-Minerals (AIRBORNE GUMMIES PO) Take 3 chew tab by mouth daily      red yeast rice 600 MG CAPS Take 600 mg by mouth daily Stopped 7/7/21      TURMERIC PO Take 2 tablets by mouth daily      vitamin C (ASCORBIC ACID) 250 MG tablet Take 250 mg by mouth daily       !! - Potential duplicate medications found. Please discuss with provider.        Activity: activity as tolerated  Diet: regular diet  Wound Care: leave dressing on until follow up appointment    Follow-up with 2 weeks with Dr. Grady's team.     Signed:  Otis De Leon NP  7/15/2021  9:19 AM

## 2021-07-15 NOTE — CONSULTS
"ACUPUNCTURIST TREATMENT NOTE    Name: Lissette Wong  :  1960  MRN:  [id number]    Acupuncture Treatment  Patient Type: Orthopedic  Intervention Reason: Pain  Pain Location: (R) knee  Pre-session Pain Ratin  Post-session Pain Ratin  Patient complaint:: (R) knee pain  Initial insertions: (L) St 34, 36, (L) Sp 10, (L) He ding, (R) scalp nitza x1, Du 20, Yin bowie       \"Risks and benefits of acupuncture were discussed with patient. Consent for treatment was given. We thank you for the referral.\"     Adán Og    Date:  7/15/2021  Time:  11:38 AM    "

## 2021-07-15 NOTE — PLAN OF CARE
Patient vital signs are at baseline: Yes  Patient able to ambulate as they were prior to admission or with assist devices provided by therapies during their stay:  Yes  Patient MUST void prior to discharge:  Yes  Patient able to tolerate oral intake:  Yes  Pain has adequate pain control using Oral analgesics:  Yes    Denies pain. Skilled monitoring in all medical conditions. No new skin issues noted. Dressing is CDI. Full sensation per pt. Assist of 1 with walker, and gait belt for transferring. Voiding adequate amounts of urine. Education on medication administration and use of call-light to reduce risk for falls and injury.  No chest pain, shortness of breath, lightheadedness, or dizziness.

## 2021-07-15 NOTE — PROGRESS NOTES
[facility]      OUTPATIENT OCCUPATIONAL THERAPY  EVALUATION  PLAN OF TREATMENT FOR OUTPATIENT REHABILITATION  (COMPLETE FOR INITIAL CLAIMS ONLY)  Patient's Last Name, First Name, M.I.  YOB: 1960  Lissette Wong                          Provider's Name  [facility] Medical Record No.  [id number]                               Onset Date:  07/14/21   Start of Care Date:  (P) 07/15/21     Type:     ___PT   _X_OT   ___SLP Medical Diagnosis:  (P) R TKA                        OT Diagnosis:  R TKA   Visits from SOC:  1   _________________________________________________________________________________  Plan of Treatment/Functional Goals    Planned Interventions: ADL retraining   Goals: See Occupational Therapy Goals on Care Plan in [facility] electronic health record.    Therapy Frequency:    Predicted Duration of Therapy Intervention: Daily  _________________________________________________________________________________    I CERTIFY THE NEED FOR THESE SERVICES FURNISHED UNDER        THIS PLAN OF TREATMENT AND WHILE UNDER MY CARE     (Physician co-signature of this document indicates review and certification of the therapy plan).                Certification date from: (P) 07/15/21, Certification date to: (P) 08/15/21                 Initial Assessment        See Occupational Therapy evaluation dated (P) 07/15/21 in Epic electronic health record.

## 2021-07-15 NOTE — PROGRESS NOTES
07/15/21 0930   Quick Adds   Quick Adds Certification       Present no   Living Environment   Living Environment Comments See OT eval   Self-Care   Equipment Currently Used at Home none   Activity/Exercise/Self-Care Comment Ambulates and negotiates stairs without AD at baseline   General Information   Onset of Illness/Injury or Date of Surgery 07/14/21   Referring Physician Dr. Grady   Patient/Family Therapy Goals Statement (PT) Walk without pain   Pertinent History of Current Problem (include personal factors and/or comorbidities that impact the POC) s/p right TKA   Weight-Bearing Status - RLE weight-bearing as tolerated   Cognition   Orientation Status (Cognition) oriented x 4   Affect/Mental Status (Cognition) WNL   Follows Commands (Cognition) WNL   Bed Mobility   Comment (Bed Mobility) Already up in chair   Transfers   Transfers sit-stand transfer   Sit-Stand Transfer   Sit-Stand [place] (Transfers) modified independence   Assistive Device (Sit-Stand Transfers) walker, front-wheeled   Sit/Stand Transfer Comments safe technique   Gait/Stairs (Locomotion)   [place] Level (Gait) supervision;modified independence   Assistive Device (Gait) walker, front-wheeled   Distance in Feet (Required for LE Total Joints) 120, 75   Pattern (Gait) step-to;step-through   Deviations/Abnormal Patterns (Gait) right sided deviations;antalgic;gait speed decreased;stride length decreased   Right Sided Gait Deviations heel strike decreased   Maintains Weight-bearing Status (Gait) able to maintain   Clinical Impression   Criteria for Skilled Therapeutic Intervention yes, treatment indicated   PT Diagnosis (PT) impaired functional mobility   Influenced by the following impairments weakness, pain   Functional limitations due to impairments transfers, gait, stairs   Clinical Presentation Stable/Uncomplicated   Clinical Presentation Rationale Pt presents as medically diagnosed   Clinical Decision  Making (Complexity) low complexity   Therapy Frequency (PT) Daily   Predicted Duration of Therapy Intervention (days/wks) 1 day   Planned Therapy Interventions (PT) home exercise program;gait training;stair training   Anticipated Equipment Needs at Discharge (PT) walker, rolling;cane, straight   Risk & Benefits of therapy have been explained patient;spouse/significant other   PT Discharge Planning    PT Discharge Recommendation (DC Rec) home with outpatient physical therapy   PT Rationale for DC Rec Ambulating and negotiating stairs safely   Therapy Certification   Start of care date 07/15/21   Certification date from 07/15/21   Certification date to 08/05/21   Medical Diagnosis S/p R TKA   Total Evaluation Time   Total Evaluation Time (Minutes) 5

## 2021-07-15 NOTE — PLAN OF CARE
Problem: Mobility Impairment (Orthopaedic Rehabilitation)  Goal: Optimal Mobility [place] and Safety  Outcome: Improving   Pt is alert and oriented.  She has been up to walk the halls and use the bathroom.  Pt is excited that her knee is much better now  after surgery.   Pt has taken oxycodone for pain control and this does seem to be working for her.  We will continue to assess and monitor.  Patient vital signs are at baseline: Yes  Patient able to ambulate as they were prior to admission or with assist devices provided by therapies during their stay:  Yes  Patient MUST void prior to discharge:  Yes  Patient able to tolerate oral intake:  Yes  Pain has adequate pain control using Oral analgesics:  Yes

## 2021-07-15 NOTE — PLAN OF CARE
Discharge instructions discussed with patient and  at bedside; verbalized understanding. Discharging to home with  to transport. Patient is stable at the time of discharge.

## 2021-07-15 NOTE — PROGRESS NOTES
Care Management Follow Up    Length of Stay (days): 0    Expected Discharge Date: 07/15/2021     Concerns to be Addressed: no discharge needs identified     Patient plan of care discussed at interdisciplinary rounds: Yes    Anticipated Discharge Disposition:  Home    Referrals Placed by CM/SW:    Private pay costs discussed: Not applicable    Additional Information:  Reviewed at discharge as needed. No CM needs at this time. Pt to discharge home with family as needed.       GHASSAN Quintero

## 2021-07-15 NOTE — PROGRESS NOTES
[facility]      OUTPATIENT PHYSICAL THERAPY EVALUATION  PLAN OF TREATMENT FOR OUTPATIENT REHABILITATION  (COMPLETE FOR INITIAL CLAIMS ONLY)  Patient's Last Name, First Name, M.I.  YOB: 1960  Lissette Wong                        Provider's Name  [facility] Medical Record No.  [id number]                               Onset Date:  (P) 07/14/21   Start of Care Date:  (P) 07/15/21      Type:     _X_PT   ___OT   ___SLP Medical Diagnosis:  (P) S/p R TKA                        PT Diagnosis:  (P) impaired functional mobility   Visits from SOC:  1   _________________________________________________________________________________  Plan of Treatment/Functional Goals    Planned Interventions: (P) home exercise program, gait training, stair training     Goals: See Physical Therapy Goals on Care Plan in [facility] electronic health record.    Therapy Frequency: (P) Daily  Predicted Duration of Therapy Intervention: (P) 1 day  _________________________________________________________________________________    I CERTIFY THE NEED FOR THESE SERVICES FURNISHED UNDER        THIS PLAN OF TREATMENT AND WHILE UNDER MY CARE     (Physician co-signature of this document indicates review and certification of the therapy plan).                Certification date from: (P) 07/15/21, Certification date to: (P) 08/05/21    Referring Physician: (P) Dr. Grady            Initial Assessment        See Physical Therapy evaluation dated (P) 07/15/21 in Epic electronic health record.

## 2021-07-15 NOTE — PLAN OF CARE
Occupational Therapy Discharge Summary    Reason for therapy discharge:    All goals and outcomes met, no further needs identified.    Progress towards therapy goal(s). See goals on Care Plan in [facility] electronic health record for goal details.  Goals met    Therapy recommendation(s):    No further therapy is recommended.    Robert Javier OTR/AMAURY  7/15/2021

## 2021-07-15 NOTE — PROGRESS NOTES
Discharged to home with spouse. Stated understanding of discharge instructions. Checked room for belongings. Stated that she has her belongings. .

## 2021-07-15 NOTE — PLAN OF CARE
Problem: Adult Inpatient Plan of Care  Goal: Optimal Comfort and Wellbeing  Outcome: Improving  Problem: Adjustment to Decreased Mobility and [place] (Orthopaedic Rehabilitation)  Goal: Optimal Coping  Outcome: Improving  Pt is doing well, she is very excited on how well she can stand on the surgical leg.   Pt has walked her 75 feet and felt she tolerated it well.  She is alert and oriented, voiding and using the call light appropriately. We will continue to assess and monitor.   Patient vital signs are at baseline: Yes  Patient able to ambulate as they were prior to admission or with assist devices provided by therapies during their stay:  Yes  Patient MUST void prior to discharge:  Yes  Patient able to tolerate oral intake:  Yes  Pain has adequate pain control using Oral analgesics:  Yes

## 2021-07-15 NOTE — PLAN OF CARE
Physical Therapy Discharge Summary    Reason for therapy discharge:    All goals and outcomes met, no further needs identified.    Progress towards therapy goal(s). See goals on Care Plan in [facility] electronic health record for goal details.  Goals met    Therapy recommendation(s):    Continue home exercise program.

## 2021-07-15 NOTE — PROGRESS NOTES
07/15/21 0730   Quick Adds   Type of Visit Initial Occupational Therapy Evaluation   Living Environment   People in home spouse   Current Living Arrangements house   Home Accessibility stairs to enter home;stairs within home   Number of Stairs, Main Entrance 4   Stair Railings, Main Entrance railing on right side (ascending)   Number of Stairs, Within Home, Primary greater than 10 stairs   Stair Railings, Within Home, Primary railing on right side (ascending)   Self-Care   Usual Activity Tolerance excellent   Current Activity Tolerance moderate  (light headedness)   Disability/Function   Vision Management glasses   General Information   Onset of Illness/Injury or Date of Surgery 07/14/21   Patient/Family Therapy Goal Statement (OT) To return home.   Right Lower Extremity (Weight-bearing Status) weight-bearing as tolerated (WBAT)   Cognitive Status Examination   Orientation Status orientation to person, place and time   Bed Mobility   Bed Mobility rolling left;rolling right   Rolling Left [place] (Bed Mobility) supervision   Rolling Right [place] (Bed Mobility) supervision   Assistive Device (Bed Mobility) other (see comments)  (assist to position pillows)   Transfers   Transfers toilet transfer;shower transfer;bed-chair transfer   Transfer Skill: Bed to Chair/Chair to Bed   Bed-Chair [place] (Transfers) modified independence   Assistive Device (Bed-Chair Transfers) rolling walker   Shower Transfer   Type (Shower Transfer) stand pivot/stand step   [place] Level (Shower Transfer) modified independence;contact guard  (Pt became dizzy stepping out of the shower.)   Assistive Device (Shower Transfer) walker, front-wheeled   Toilet Transfer   Type (Toilet Transfer) sit-stand   [place] Level (Toilet Transfer) modified independence   Assistive Device (Toilet Transfer) raised toilet seat;grab bars/safety frame   Activities of Daily Living   BADL Assessment upper body dressing;lower body dressing    Upper Body Dressing Assessment   [place] Level (Upper Body Dressing) independent   Position (Upper Body Dressing) supported sitting   Lower Body Dressing Assessment   [place] Level (Lower Body Dressing) independent   Position (Lower Body Dressing) supported sitting   Clinical Impression   Criteria for Skilled Therapeutic Interventions Met (OT) yes   OT Diagnosis R TKA   OT Problem List-Impairments impacting ADL mobility   Assessment of Occupational Performance 1-3 Performance Deficits   Identified Performance Deficits Dressing, toileting and trsfs.   Planned Therapy Interventions (OT) ADL retraining   Clinical Decision Making Complexity (OT) moderate complexity   Predicted Duration of Therapy Daily   Anticipated Equipment Needs Upon Discharge (OT)   (safety frame around toilet.)   Risk & Benefits of therapy have been explained patient;spouse/significant other   OT Discharge Planning    OT Discharge Recommendation (DC Rec) Home with assist   OT Rationale for DC Rec Pt moving well.  Episode of dizziness, RN informed and BP checked.  Resolved with cold washcloth and supine position.   will be able to assist at home.  Pt and  understood ADL techniques and precautions.   Total Evaluation Time (Minutes)   Total Evaluation Time (Minutes) 15

## 2021-07-15 NOTE — PROGRESS NOTES
"[facility] Orthopaedics Progress Note      Post-operative Day: 1 Day Post-Op    Procedure(s):  RIGHT TOTAL KNEE ARTHROPLASTY        Plan: Anticoagulation protocol:  mg daily  x 42  days    Pain medications:  oxycodone, tylenol and Celebrex. We discussed the importance of consistent pain management (on a schedule,) and a weaning schedule. We also discussed the importance of avoiding breakthrough pain as much as possible.   Has been tolerating oxycodone well (without lightheadedness or nausea). Discontinued dilaudid.     Held lisinopril this am related to systolic BP at 120 this morning post pre-syncopal episode, goal avoiding any additional lightheadedness.  Orthostatic blood pressures good.               Weight bearing status:  WBAT             Disposition:  Home today.              Continue cares and rehabilitation     Subjective:  Lissette is a pleasant 61 year old woman who reports not sleeping well last night. She has had 2 pre-syncopal episodes and shares concerns about her pain management and possibility of addiction.   Pain: moderate  Chest pain, SOB:  No  Denies current lightheadedness/dizziness  Mild nausea. No vomiting  Not yet passing flatus.  voiding well    Objective:  Blood pressure 115/64, pulse 59, temperature 98  F (36.7  C), temperature source Oral, resp. rate 16, height 1.613 m (5' 3.5\"), weight 93.5 kg (206 lb 3.2 oz), SpO2 93 %, unknown if currently breastfeeding.    Patient Vitals for the past 24 hrs:   BP Temp Temp src Pulse Resp SpO2 Height Weight   07/15/21 0839 121/62 -- -- -- -- -- -- --   07/15/21 0837 115/64 -- -- -- -- -- -- --   07/15/21 0834 118/66 98  F (36.7  C) Oral 59 16 93 % -- --   07/15/21 0803 121/64 -- -- 54 -- -- -- --   07/15/21 0123 124/60 98.5  F (36.9  C) Oral 61 16 93 % -- --   07/14/21 2124 111/60 97.5  F (36.4  C) Oral 55 16 94 % -- --   07/14/21 1732 133/67 97.8  F (36.6  C) Oral 57 16 97 % -- --   07/14/21 0305 (!) 146/71 97.5  F (36.4  C) Oral 60 16 95 % -- -- " "  07/14/21 1549 129/70 97.7  F (36.5  C) Oral 61 16 95 % -- --   07/14/21 1515 (!) 145/77 98  F (36.7  C) Oral 69 16 96 % -- --   07/14/21 1445 138/73 97.8  F (36.6  C) Oral 64 16 98 % -- --   07/14/21 1400 130/68 -- -- 65 9 95 % -- --   07/14/21 1350 128/68 -- -- 67 10 97 % -- --   07/14/21 1340 121/62 -- -- 75 13 97 % -- --   07/14/21 1330 112/57 -- -- 83 17 97 % -- --   07/14/21 1327 115/59 97.6  F (36.4  C) Temporal 90 16 96 % -- --   07/14/21 1155 (!) 144/73 -- -- 75 20 99 % -- --   07/14/21 1150 (!) 160/83 -- -- 75 20 99 % -- --   07/14/21 1145 130/60 -- -- 70 20 98 % -- --   07/14/21 1140 138/56 -- -- 76 20 97 % -- --   07/14/21 1135 (!) 177/79 -- -- 79 18 96 % -- --   07/14/21 1131 (!) 145/93 -- -- 64 21 100 % -- --   07/14/21 1130 -- -- -- 61 19 100 % -- --   07/14/21 1050 127/77 -- -- 65 14 -- -- --   07/14/21 1044 127/77 99.4  F (37.4  C) Temporal 65 18 95 % 1.613 m (5' 3.5\") 93.5 kg (206 lb 3.2 oz)       Wt Readings from Last 4 Encounters:   07/14/21 93.5 kg (206 lb 3.2 oz)   06/24/21 93.4 kg (206 lb)   04/19/21 95.3 kg (210 lb)   02/10/21 99.7 kg (219 lb 12.8 oz)     Orthostatic blood pressures show no concerning signs.     Motor function, sensation, and circulation intact   Yes  Wound status: incisions are clean dry and intact. Yes  Calf tenderness: Bilateral  No    Pertinent Labs   Lab Results: personally reviewed.     Recent Labs   Lab Test 07/15/21  0604 06/24/21  1221 04/12/21  1158 02/09/21  0825 02/07/21  1502   INR  --   --   --   --  0.99   HGB 9.7* 13.1  --  13.0 12.9   HCT  --  40.9  --  41.6 39.7   MCV  --  91  --  89 89   PLT  --  281  --  306 286   NA  --  140 142 139 138   CRP  --   --   --  <2.9  --        Report completed by:  Otis DeL eon NP  Date: 7/15/2021  Time: 8:39 AM      "

## 2021-07-27 ENCOUNTER — TRANSFERRED RECORDS (OUTPATIENT)
Dept: HEALTH INFORMATION MANAGEMENT | Facility: CLINIC | Age: 61
End: 2021-07-27

## 2021-08-15 ENCOUNTER — HEALTH MAINTENANCE LETTER (OUTPATIENT)
Age: 61
End: 2021-08-15

## 2021-08-24 ENCOUNTER — TRANSFERRED RECORDS (OUTPATIENT)
Dept: HEALTH INFORMATION MANAGEMENT | Facility: CLINIC | Age: 61
End: 2021-08-24

## 2021-10-01 DIAGNOSIS — Z11.59 ENCOUNTER FOR SCREENING FOR OTHER VIRAL DISEASES: ICD-10-CM

## 2021-10-11 ENCOUNTER — HEALTH MAINTENANCE LETTER (OUTPATIENT)
Age: 61
End: 2021-10-11

## 2021-10-11 ENCOUNTER — OFFICE VISIT (OUTPATIENT)
Dept: INTERNAL MEDICINE | Facility: CLINIC | Age: 61
End: 2021-10-11
Payer: COMMERCIAL

## 2021-10-11 VITALS
WEIGHT: 208.7 LBS | HEIGHT: 64 IN | BODY MASS INDEX: 35.63 KG/M2 | OXYGEN SATURATION: 99 % | HEART RATE: 66 BPM | SYSTOLIC BLOOD PRESSURE: 140 MMHG | DIASTOLIC BLOOD PRESSURE: 84 MMHG

## 2021-10-11 DIAGNOSIS — Z01.818 PREOPERATIVE EXAMINATION: Primary | ICD-10-CM

## 2021-10-11 DIAGNOSIS — I10 BENIGN ESSENTIAL HYPERTENSION: ICD-10-CM

## 2021-10-11 DIAGNOSIS — M17.0 BILATERAL PRIMARY OSTEOARTHRITIS OF KNEE: ICD-10-CM

## 2021-10-11 PROBLEM — N32.81 OVERACTIVE BLADDER: Status: ACTIVE | Noted: 2019-10-24

## 2021-10-11 PROBLEM — E78.2 MIXED HYPERLIPIDEMIA: Status: ACTIVE | Noted: 2019-10-24

## 2021-10-11 LAB
ANION GAP SERPL CALCULATED.3IONS-SCNC: 8 MMOL/L (ref 5–18)
BUN SERPL-MCNC: 12 MG/DL (ref 8–22)
CALCIUM SERPL-MCNC: 10.2 MG/DL (ref 8.5–10.5)
CHLORIDE BLD-SCNC: 104 MMOL/L (ref 98–107)
CO2 SERPL-SCNC: 26 MMOL/L (ref 22–31)
CREAT SERPL-MCNC: 0.71 MG/DL (ref 0.6–1.1)
ERYTHROCYTE [DISTWIDTH] IN BLOOD BY AUTOMATED COUNT: 13.3 % (ref 10–15)
GFR SERPL CREATININE-BSD FRML MDRD: >90 ML/MIN/1.73M2
GLUCOSE BLD-MCNC: 99 MG/DL (ref 70–125)
HCT VFR BLD AUTO: 41.3 % (ref 35–47)
HGB BLD-MCNC: 13.1 G/DL (ref 11.7–15.7)
MCH RBC QN AUTO: 28.7 PG (ref 26.5–33)
MCHC RBC AUTO-ENTMCNC: 31.7 G/DL (ref 31.5–36.5)
MCV RBC AUTO: 90 FL (ref 78–100)
PLATELET # BLD AUTO: 290 10E3/UL (ref 150–450)
POTASSIUM BLD-SCNC: 4.4 MMOL/L (ref 3.5–5)
RBC # BLD AUTO: 4.57 10E6/UL (ref 3.8–5.2)
SODIUM SERPL-SCNC: 138 MMOL/L (ref 136–145)
WBC # BLD AUTO: 5.8 10E3/UL (ref 4–11)

## 2021-10-11 PROCEDURE — 93010 ELECTROCARDIOGRAM REPORT: CPT | Performed by: GENERAL ACUTE CARE HOSPITAL

## 2021-10-11 PROCEDURE — 99214 OFFICE O/P EST MOD 30 MIN: CPT | Performed by: INTERNAL MEDICINE

## 2021-10-11 PROCEDURE — 93005 ELECTROCARDIOGRAM TRACING: CPT | Performed by: INTERNAL MEDICINE

## 2021-10-11 PROCEDURE — 85027 COMPLETE CBC AUTOMATED: CPT | Performed by: INTERNAL MEDICINE

## 2021-10-11 PROCEDURE — 80048 BASIC METABOLIC PNL TOTAL CA: CPT | Performed by: INTERNAL MEDICINE

## 2021-10-11 PROCEDURE — 36415 COLL VENOUS BLD VENIPUNCTURE: CPT | Performed by: INTERNAL MEDICINE

## 2021-10-11 ASSESSMENT — MIFFLIN-ST. JEOR: SCORE: 1479.41

## 2021-10-11 NOTE — H&P (VIEW-ONLY)
[facility]  [address] [address]  Phone: [phone]  Fax: [phone]  Primary Provider: Humberto Og  Pre-op Performing Provider: HUMBERTO OG      PREOPERATIVE EVALUATION:  Today's date: 10/11/2021    Lissette Wong is a 61 year old female who presents for a preoperative evaluation.    Surgical Information:  Surgery/Procedure: LT total knee arthroplasty  Surgery Location: [facility]  Surgeon: Kendell Grady  Surgery Date: 10/27/21  Time of Surgery:   Where patient plans to recover: At home with family  Fax number for surgical facility: Note does not need to be faxed, will be available electronically in Epic.    Type of Anesthesia Anticipated: Spinal    Assessment & Plan     The proposed surgical procedure is considered INTERMEDIATE risk.    Problem List Items Addressed This Visit     Bilateral primary osteoarthritis of knee    Benign essential hypertension    Relevant Orders    Basic metabolic panel  (Ca, Cl, CO2, Creat, Gluc, K, Na, BUN)    CBC with platelets      Other Visit Diagnoses     Preoperative examination    -  Primary    Relevant Orders    EKG 12-lead, tracing only (Completed)            RECOMMENDATION:  APPROVAL GIVEN to proceed with proposed procedure, without further diagnostic evaluation.  No aspirin or NSAIDs 7 days prior to the surgery.  Hold lisinopril the morning of surgery.          Subjective     HPI related to upcoming procedure: Lissette has end-stage left knee osteoarthritis that is not responsive to nonoperative treatment and is going to undergo a left total knee arthroplasty for definitive treatment.  Feeling well today.  History of a TIA in February of this year, no residual effects.  History of hypertension, currently on 10 mg of lisinopril.  No history of obstructive sleep apnea or underlying lung disease.    Preop Questions 10/11/2021   1. Have you ever had a heart attack or stroke? No   2. Have you ever had surgery on  your heart or blood vessels, such as a stent placement, a coronary artery bypass, or surgery on an artery in your head, neck, heart, or legs? No   3. Do you have chest pain with activity? No   4. Do you have a history of  heart failure? No   5. Do you currently have a cold, bronchitis or symptoms of other infection? No   6. Do you have a cough, shortness of breath, or wheezing? No   7. Do you or anyone in your family have previous history of blood clots? No   8. Do you or does anyone in your family have a serious bleeding problem such as prolonged bleeding following surgeries or cuts? No   9. Have you ever had problems with anemia or been told to take iron pills? No   10. Have you had any abnormal blood loss such as black, tarry or bloody stools, or abnormal vaginal bleeding? No   11. Have you ever had a blood transfusion? YES -    11a. Have you ever had a transfusion reaction? No   12. Are you willing to have a blood transfusion if it is medically needed before, during, or after your surgery? Yes   13. Have you or any of your relatives ever had problems with anesthesia? No   14. Do you have sleep apnea, excessive snoring or daytime drowsiness? No   15. Do you have any artifical heart valves or other implanted medical devices like a pacemaker, defibrillator, or continuous glucose monitor? No   16. Do you have artificial joints? YES - RT knee   17. Are you allergic to latex? No       Patient Active Problem List    Diagnosis Date Noted     History of total knee arthroplasty, right 07/14/2021     Priority: Medium     TIA (transient ischemic attack) 02/07/2021     Priority: Medium      Past Medical History:   Diagnosis Date     Antiplatelet or antithrombotic long-term use      Arthritis      Cerebral artery occlusion with cerebral infarction (H)     feb 21     Hyperlipidemia      Hypertension      Migraines      Motion sickness      Obesity      Overactive bladder      Subdural hemorrhage (H)      TIA (transient  ischemic attack) 02/2021     Past Surgical History:   Procedure Laterality Date     ARTHROPLASTY KNEE Right 7/14/2021    Procedure: RIGHT TOTAL KNEE ARTHROPLASTY;  Surgeon: Kendell Grady MD;  Location: [facility]     CHOLECYSTECTOMY  1986     HYSTERECTOMY  1993     REPAIR HAMMER TOE Bilateral 7/24/2018    Procedure: HAMMER TOE CORRECTION SECOND TOE BOTH FEET ,EXTENSOR TENOTOMY BOTH TOES;  Surgeon: Yasir Andrews DPM;  Location: [facility];  Service:      Current Outpatient Medications   Medication Sig Dispense Refill     acetaminophen (TYLENOL) 500 MG tablet Take 1-2 tablets (500-1,000 mg) by mouth every 6 hours       Acidophilus Lactobacillus CAPS Take 3 capsules by mouth At Bedtime Mother Earth Brand       clobetasol (TEMOVATE) 0.05 % external cream Apply topically 2 times daily as needed       fexofenadine (ALLEGRA) 180 MG tablet Take 180 mg by mouth daily        fish oil-omega-3 fatty acids 1000 MG capsule Take 2 g by mouth daily        fluticasone (FLONASE) 50 MCG/ACT nasal spray Spray 1 spray into both nostrils daily as needed for rhinitis or allergies       Glucosamine-Chondroitin 250-200 MG TABS Take 2 tablets by mouth daily        lisinopril (ZESTRIL) 10 MG tablet Take 1 tablet (10 mg) by mouth daily 30 tablet 0     Multiple Vitamins-Minerals (AIRBORNE GUMMIES PO) Take 3 chew tab by mouth daily       red yeast rice 600 MG CAPS Take 600 mg by mouth daily        senna-docusate (SENOKOT-S/PERICOLACE) 8.6-50 MG tablet Take 1 tablet by mouth daily as needed for constipation  0     TURMERIC PO Take 2 tablets by mouth daily       vitamin C (ASCORBIC ACID) 250 MG tablet Take 250 mg by mouth daily       Vitamin D3 (CHOLECALCIFEROL) 125 MCG (5000 UT) tablet Take 125 mcg by mouth daily       acetaminophen (TYLENOL) 500 MG tablet Take 1,000 mg by mouth every 6 hours as needed for pain       celecoxib (CELEBREX) 100 MG capsule Take 1 capsule (100 mg) by mouth 2 times daily for 15 days 30  "capsule 0     hydrOXYzine (ATARAX) 25 MG tablet Take 1 tablet (25 mg) by mouth every 6 hours as needed for itching or anxiety (with pain, moderate pain) (Patient not taking: Reported on 10/11/2021) 30 tablet 0     ondansetron (ZOFRAN-ODT) 4 MG ODT tab Take 1 tablet (4 mg) by mouth every 6 hours as needed for nausea or vomiting (Patient not taking: Reported on 10/11/2021) 4 tablet 0     oxyCODONE (ROXICODONE) 5 MG tablet Take 1-2 tablets (5-10 mg) by mouth every 4 hours as needed for breakthrough pain or moderate to severe pain Do not exceed 7 tablets per day. (Patient not taking: Reported on 10/11/2021) 25 tablet 0       Allergies   Allergen Reactions     Morphine Anaphylaxis     Hives, throat swells and can't breath.        Social History     Tobacco Use     Smoking status: Never Smoker     Smokeless tobacco: Never Used   Substance Use Topics     Alcohol use: Yes     Alcohol/week: 1.0 standard drinks     Comment: 1 per week       History   Drug Use No         Objective     BP (!) 140/84 (BP Location: Right arm, Patient Position: Sitting, Cuff Size: Adult Large)   Pulse 66   Ht 1.614 m (5' 3.54\")   Wt 94.7 kg (208 lb 11.2 oz)   SpO2 99%   BMI 36.34 kg/m      Physical Exam    GENERAL APPEARANCE: healthy, alert and no distress     EYES: EOMI, PERRL     HENT: ear canals and TM's normal and nose and mouth without ulcers or lesions     NECK: no adenopathy, no asymmetry, masses, or scars and thyroid normal to palpation     RESP: lungs clear to auscultation - no rales, rhonchi or wheezes     CV: regular rates and rhythm, normal S1 S2, no S3 or S4 and no murmur, click or rub     ABDOMEN:  soft, nontender, no HSM or masses and bowel sounds normal     MS: extremities normal- no gross deformities noted, no evidence of inflammation in joints, FROM in all extremities.     SKIN: no suspicious lesions or rashes     NEURO: Normal strength and tone, sensory exam grossly normal, mentation intact and speech normal     PSYCH: " mentation appears normal. and affect normal/bright     LYMPHATICS: No cervical adenopathy    Recent Labs   Lab Test 07/15/21  0604 06/24/21  1221 04/12/21  1158 02/09/21  0825 02/07/21  2031 02/07/21  1502 02/07/21  1502   HGB 9.7* 13.1  --  13.0  --    < > 12.9   PLT  --  281  --  306  --   --  286   INR  --   --   --   --   --   --  0.99   NA  --  140 142 139  --    < > 138   POTASSIUM  --  4.7 4.3 3.6  --    < > 3.8   CR  --  0.76 0.75 0.80  --    < > 0.83   A1C  --   --   --   --  5.4  --   --     < > = values in this interval not displayed.        Diagnostics:  Check a BMP and CBC today.    EKG was obtained and personally reviewed by me today.  Normal sinus rhythm, no ST or T wave changes.    Revised Cardiac Risk Index (RCRI):  The patient has the following serious cardiovascular risks for perioperative complications:   - Cerebrovascular Disease (TIA or CVA) = 1 point     RCRI Interpretation: 1 point: Class II (low risk - 0.9% complication rate)           Signed Electronically by: Humberto Og MD  Copy of this evaluation report is provided to requesting physician.

## 2021-10-11 NOTE — PROGRESS NOTES
[facility]  [address] [address]  Phone: [phone]  Fax: [phone]  Primary Provider: Humberto Og  Pre-op Performing Provider: HUMBERTO OG      PREOPERATIVE EVALUATION:  Today's date: 10/11/2021    Lissette Wong is a 61 year old female who presents for a preoperative evaluation.    Surgical Information:  Surgery/Procedure: LT total knee arthroplasty  Surgery Location: [facility]  Surgeon: Kendell Grady  Surgery Date: 10/27/21  Time of Surgery:   Where patient plans to recover: At home with family  Fax number for surgical facility: Note does not need to be faxed, will be available electronically in Epic.    Type of Anesthesia Anticipated: Spinal    Assessment & Plan     The proposed surgical procedure is considered INTERMEDIATE risk.    Problem List Items Addressed This Visit     Bilateral primary osteoarthritis of knee    Benign essential hypertension    Relevant Orders    Basic metabolic panel  (Ca, Cl, CO2, Creat, Gluc, K, Na, BUN)    CBC with platelets      Other Visit Diagnoses     Preoperative examination    -  Primary    Relevant Orders    EKG 12-lead, tracing only (Completed)            RECOMMENDATION:  APPROVAL GIVEN to proceed with proposed procedure, without further diagnostic evaluation.  No aspirin or NSAIDs 7 days prior to the surgery.  Hold lisinopril the morning of surgery.          Subjective     HPI related to upcoming procedure: Lissette has end-stage left knee osteoarthritis that is not responsive to nonoperative treatment and is going to undergo a left total knee arthroplasty for definitive treatment.  Feeling well today.  History of a TIA in February of this year, no residual effects.  History of hypertension, currently on 10 mg of lisinopril.  No history of obstructive sleep apnea or underlying lung disease.    Preop Questions 10/11/2021   1. Have you ever had a heart attack or stroke? No   2. Have you ever had surgery on  your heart or blood vessels, such as a stent placement, a coronary artery bypass, or surgery on an artery in your head, neck, heart, or legs? No   3. Do you have chest pain with activity? No   4. Do you have a history of  heart failure? No   5. Do you currently have a cold, bronchitis or symptoms of other infection? No   6. Do you have a cough, shortness of breath, or wheezing? No   7. Do you or anyone in your family have previous history of blood clots? No   8. Do you or does anyone in your family have a serious bleeding problem such as prolonged bleeding following surgeries or cuts? No   9. Have you ever had problems with anemia or been told to take iron pills? No   10. Have you had any abnormal blood loss such as black, tarry or bloody stools, or abnormal vaginal bleeding? No   11. Have you ever had a blood transfusion? YES -    11a. Have you ever had a transfusion reaction? No   12. Are you willing to have a blood transfusion if it is medically needed before, during, or after your surgery? Yes   13. Have you or any of your relatives ever had problems with anesthesia? No   14. Do you have sleep apnea, excessive snoring or daytime drowsiness? No   15. Do you have any artifical heart valves or other implanted medical devices like a pacemaker, defibrillator, or continuous glucose monitor? No   16. Do you have artificial joints? YES - RT knee   17. Are you allergic to latex? No       Patient Active Problem List    Diagnosis Date Noted     History of total knee arthroplasty, right 07/14/2021     Priority: Medium     TIA (transient ischemic attack) 02/07/2021     Priority: Medium      Past Medical History:   Diagnosis Date     Antiplatelet or antithrombotic long-term use      Arthritis      Cerebral artery occlusion with cerebral infarction (H)     feb 21     Hyperlipidemia      Hypertension      Migraines      Motion sickness      Obesity      Overactive bladder      Subdural hemorrhage (H)      TIA (transient  ischemic attack) 02/2021     Past Surgical History:   Procedure Laterality Date     ARTHROPLASTY KNEE Right 7/14/2021    Procedure: RIGHT TOTAL KNEE ARTHROPLASTY;  Surgeon: Kendell Grady MD;  Location: [facility]     CHOLECYSTECTOMY  1986     HYSTERECTOMY  1993     REPAIR HAMMER TOE Bilateral 7/24/2018    Procedure: HAMMER TOE CORRECTION SECOND TOE BOTH FEET ,EXTENSOR TENOTOMY BOTH TOES;  Surgeon: Yasir Andrews DPM;  Location: [facility];  Service:      Current Outpatient Medications   Medication Sig Dispense Refill     acetaminophen (TYLENOL) 500 MG tablet Take 1-2 tablets (500-1,000 mg) by mouth every 6 hours       Acidophilus Lactobacillus CAPS Take 3 capsules by mouth At Bedtime Mother [employer] Brand       clobetasol (TEMOVATE) 0.05 % external cream Apply topically 2 times daily as needed       fexofenadine (ALLEGRA) 180 MG tablet Take 180 mg by mouth daily        fish oil-omega-3 fatty acids 1000 MG capsule Take 2 g by mouth daily        fluticasone (FLONASE) 50 MCG/ACT nasal spray Spray 1 spray into both nostrils daily as needed for rhinitis or allergies       Glucosamine-Chondroitin 250-200 MG TABS Take 2 tablets by mouth daily        lisinopril (ZESTRIL) 10 MG tablet Take 1 tablet (10 mg) by mouth daily 30 tablet 0     Multiple Vitamins-Minerals (AIRBORNE GUMMIES PO) Take 3 chew tab by mouth daily       red yeast rice 600 MG CAPS Take 600 mg by mouth daily        senna-docusate (SENOKOT-S/PERICOLACE) 8.6-50 MG tablet Take 1 tablet by mouth daily as needed for constipation  0     TURMERIC PO Take 2 tablets by mouth daily       vitamin C (ASCORBIC ACID) 250 MG tablet Take 250 mg by mouth daily       Vitamin D3 (CHOLECALCIFEROL) 125 MCG (5000 UT) tablet Take 125 mcg by mouth daily       acetaminophen (TYLENOL) 500 MG tablet Take 1,000 mg by mouth every 6 hours as needed for pain       celecoxib (CELEBREX) 100 MG capsule Take 1 capsule (100 mg) by mouth 2 times daily for 15 days 30  "capsule 0     hydrOXYzine (ATARAX) 25 MG tablet Take 1 tablet (25 mg) by mouth every 6 hours as needed for itching or anxiety (with pain, moderate pain) (Patient not taking: Reported on 10/11/2021) 30 tablet 0     ondansetron (ZOFRAN-ODT) 4 MG ODT tab Take 1 tablet (4 mg) by mouth every 6 hours as needed for nausea or vomiting (Patient not taking: Reported on 10/11/2021) 4 tablet 0     oxyCODONE (ROXICODONE) 5 MG tablet Take 1-2 tablets (5-10 mg) by mouth every 4 hours as needed for breakthrough pain or moderate to severe pain Do not exceed 7 tablets per day. (Patient not taking: Reported on 10/11/2021) 25 tablet 0       Allergies   Allergen Reactions     Morphine Anaphylaxis     Hives, throat swells and can't breath.        Social History     Tobacco Use     Smoking status: Never Smoker     Smokeless tobacco: Never Used   Substance Use Topics     Alcohol use: Yes     Alcohol/week: 1.0 standard drinks     Comment: 1 per week       History   Drug Use No         Objective     BP (!) 140/84 (BP Location: Right arm, Patient Position: Sitting, Cuff Size: Adult Large)   Pulse 66   Ht 1.614 m (5' 3.54\")   Wt 94.7 kg (208 lb 11.2 oz)   SpO2 99%   BMI 36.34 kg/m      Physical Exam    GENERAL APPEARANCE: healthy, alert and no distress     EYES: EOMI, PERRL     HENT: ear canals and TM's normal and nose and mouth without ulcers or lesions     NECK: no adenopathy, no asymmetry, masses, or scars and thyroid normal to palpation     RESP: lungs clear to auscultation - no rales, rhonchi or wheezes     CV: regular rates and rhythm, normal S1 S2, no S3 or S4 and no murmur, click or rub     ABDOMEN:  soft, nontender, no HSM or masses and bowel sounds normal     MS: extremities normal- no gross deformities noted, no evidence of inflammation in joints, FROM in all extremities.     SKIN: no suspicious lesions or rashes     NEURO: Normal strength and tone, sensory exam grossly normal, mentation intact and speech normal     PSYCH: " mentation appears normal. and affect normal/bright     LYMPHATICS: No cervical adenopathy    Recent Labs   Lab Test 07/15/21  0604 06/24/21  1221 04/12/21  1158 02/09/21  0825 02/07/21  2031 02/07/21  1502 02/07/21  1502   HGB 9.7* 13.1  --  13.0  --    < > 12.9   PLT  --  281  --  306  --   --  286   INR  --   --   --   --   --   --  0.99   NA  --  140 142 139  --    < > 138   POTASSIUM  --  4.7 4.3 3.6  --    < > 3.8   CR  --  0.76 0.75 0.80  --    < > 0.83   A1C  --   --   --   --  5.4  --   --     < > = values in this interval not displayed.        Diagnostics:  Check a BMP and CBC today.    EKG was obtained and personally reviewed by me today.  Normal sinus rhythm, no ST or T wave changes.    Revised Cardiac Risk Index (RCRI):  The patient has the following serious cardiovascular risks for perioperative complications:   - Cerebrovascular Disease (TIA or CVA) = 1 point     RCRI Interpretation: 1 point: Class II (low risk - 0.9% complication rate)           Signed Electronically by: Humberto Og MD  Copy of this evaluation report is provided to requesting physician.

## 2021-10-12 LAB
ATRIAL RATE - MUSE: 60 BPM
DIASTOLIC BLOOD PRESSURE - MUSE: NORMAL MMHG
INTERPRETATION ECG - MUSE: NORMAL
P AXIS - MUSE: 35 DEGREES
PR INTERVAL - MUSE: 196 MS
QRS DURATION - MUSE: 102 MS
QT - MUSE: 428 MS
QTC - MUSE: 428 MS
R AXIS - MUSE: -3 DEGREES
SYSTOLIC BLOOD PRESSURE - MUSE: NORMAL MMHG
T AXIS - MUSE: 20 DEGREES
VENTRICULAR RATE- MUSE: 60 BPM

## 2021-10-22 ENCOUNTER — TRANSFERRED RECORDS (OUTPATIENT)
Dept: HEALTH INFORMATION MANAGEMENT | Facility: CLINIC | Age: 61
End: 2021-10-22
Payer: COMMERCIAL

## 2021-10-24 ENCOUNTER — LAB (OUTPATIENT)
Dept: FAMILY MEDICINE | Facility: CLINIC | Age: 61
End: 2021-10-24
Attending: ORTHOPAEDIC SURGERY
Payer: COMMERCIAL

## 2021-10-24 DIAGNOSIS — Z11.59 ENCOUNTER FOR SCREENING FOR OTHER VIRAL DISEASES: ICD-10-CM

## 2021-10-24 PROCEDURE — U0005 INFEC AGEN DETEC AMPLI PROBE: HCPCS

## 2021-10-24 PROCEDURE — U0003 INFECTIOUS AGENT DETECTION BY NUCLEIC ACID (DNA OR RNA); SEVERE ACUTE RESPIRATORY SYNDROME CORONAVIRUS 2 (SARS-COV-2) (CORONAVIRUS DISEASE [COVID-19]), AMPLIFIED PROBE TECHNIQUE, MAKING USE OF HIGH THROUGHPUT TECHNOLOGIES AS DESCRIBED BY CMS-2020-01-R: HCPCS

## 2021-10-25 LAB — SARS-COV-2 RNA RESP QL NAA+PROBE: NEGATIVE

## 2021-10-26 NOTE — TREATMENT PLAN
Orthopedic Surgery Pre-Op Plan: Lissette Wong  pre-op review. This is NOT an H&P   Surgeon: Dr. Kendell Grady    Hospital: [facility]  Name of Surgery: Left Total Knee Arthroplasty   Date of Surgery: 10/27/21   H&P: Completed 10/11/21 by Dr. Humberto Og at [facility].  History of ASA, NSAIDS, vitamin and/or herbal supplements within 10 days: Yes- ASA 81 mg daily, Fish Oil, Glucosamine-Chondroitin, Multivitamins, Red Yeast Rice, Turmeric, Celebrex- instructed to hold these medications/supplements for 7 days before surgery.   History of blood thinners: Yes- previously on Plavix for approximately 2 months following TIA in Feb 2021. Plavix was discontinued end of April 2021.     Plan:   1) Discharge Plan: Home on morning of POD 1 with assist of her , Nils. Please see Discharge Planning section near bottom of this note for further details.     2) Transient Ischemic Attack (TIA): Presented to [facility] ED 2/7/21 following sudden onset of aphasia and facial droop. Neurology was consulted and planned to give TPA however symptoms resolved before TPA was administered. CTA of head on 2/7/21 found stenosis of the left middle cerebral arteries along with the right posterior cerebral arteries. Was transferred to [facility] for further evaluation by Neurology. Echo with bubble study was negative. No remaining neurological deficits. On ASA 81 mg and Plavix following TIA. Given ok to stop Plavix at end of April 2021 and now just on ASA 81 mg daily which she was instructed to hold for 7 days before surgery. Would recommend resuming ASA as soon as safely able after surgery per Dr. Grady.     3) Hypertension: BP slightly elevated at 140/84 at pre-op exam but has generally been well-controlled on lisinopril. Instructed to hold lisinopril on the day of surgery.     4) Hyperlipidemia: Not on statin therapy per patient choice.     Patient appears medically optimized for  upcoming surgery. I would recommend Hospitalist Consult to assist with medical management. Please call me below with any questions on this patient.       Review of Systems Notable for: TIA, HTN, HLD.     Past Medical History:   Past Medical History:   Diagnosis Date     Antiplatelet or antithrombotic long-term use      Arthritis      Cerebral artery occlusion with cerebral infarction (H)     feb 21     History of blood transfusion 1986     Hyperlipidemia      Hypertension      Migraines      Motion sickness      Obesity      Overactive bladder      Subdural hemorrhage (H)      TIA (transient ischemic attack) 02/2021     Past Surgical History:   Procedure Laterality Date     ARTHROPLASTY KNEE Right 7/14/2021    Procedure: RIGHT TOTAL KNEE ARTHROPLASTY;  Surgeon: Kendell Grady MD;  Location: [facility]     CHOLECYSTECTOMY  1986     HYSTERECTOMY  1993     REPAIR HAMMER TOE Bilateral 7/24/2018    Procedure: HAMMER TOE CORRECTION SECOND TOE BOTH FEET ,EXTENSOR TENOTOMY BOTH TOES;  Surgeon: Yasir Andrews DPM;  Location: [facility];  Service:        Current Medications:  Patient's Medications   New Prescriptions    No medications on file   Previous Medications    ACETAMINOPHEN (TYLENOL) 500 MG TABLET    Take 1-2 tablets (500-1,000 mg) by mouth every 6 hours    ACIDOPHILUS LACTOBACILLUS CAPS    Take 3 capsules by mouth At Bedtime Mother [employer] Brand    ASPIRIN (ASA) 325 MG EC TABLET    Take 325 mg by mouth daily Stopped 10/20/21    CLOBETASOL (TEMOVATE) 0.05 % EXTERNAL CREAM    Apply topically 2 times daily as needed    FEXOFENADINE (ALLEGRA) 180 MG TABLET    Take 180 mg by mouth daily     FISH OIL-OMEGA-3 FATTY ACIDS 1000 MG CAPSULE    Take 2 g by mouth daily Stopped 10/20/21    GLUCOSAMINE-CHONDROITIN 250-200 MG TABS    Take 2 tablets by mouth daily Stopped 10/20/21    LISINOPRIL (ZESTRIL) 10 MG TABLET    Take 1 tablet (10 mg) by mouth daily    MULTIPLE VITAMINS-MINERALS (AIRBORNE GUMMIES  PO)    Take 3 chew tab by mouth daily    RED YEAST RICE 600 MG CAPS    Take 600 mg by mouth daily     TURMERIC PO    Take 2 tablets by mouth daily Stopped 10/21/21    VITAMIN C (ASCORBIC ACID) 250 MG TABLET    Take 250 mg by mouth daily    VITAMIN D3 (CHOLECALCIFEROL) 125 MCG (5000 UT) TABLET    Take 125 mcg by mouth daily   Modified Medications    No medications on file   Discontinued Medications    No medications on file       ALLERGIES:  Allergies   Allergen Reactions     Morphine Anaphylaxis     Hives, throat swells and can't breath.       Social History  Social History     Tobacco Use     Smoking status: Never Smoker     Smokeless tobacco: Never Used   Substance Use Topics     Alcohol use: Yes     Alcohol/week: 1.0 standard drinks     Comment: 1 per week     Drug use: No       Any Abnormal Recent Diagnostics? No    Discharge Planning:   Plans to discharge to home on POD 1 in the morning after PT with her , Nils.          10/21/21 4895   Discharge Planning   Patient/Family Anticipates Transition to home with family   Concerns to be Addressed no discharge needs identified   Living Arrangements   People in home spouse   Type of Residence Private Residence   Is your private residence a single family home or apartment? Single family home   Number of Stairs, Within Home, Primary greater than 10 stairs   Stair Railings, Within Home, Primary railings safe and in good condition   Once home, are you able to live on one level? No   Which rooms are not on the main level? Bedroom   Bathroom Shower/Tub Walk-in shower   Equipment Currently Used at Home shower chair;raised toilet seat;grab bar, tub/shower  (cane and walker to use after surgery)   Support System   Support Systems Spouse/Significant Other  (, Nils)   Do you have someone available to stay with you one or two nights once you are home? Yes   Blood   Known bleeding disorder or coagulopathy? No   Does the patient have any Worship/cultural  preferences related to blood products? No   Education   Patient attended total joint pre-op class/received pre-op teaching  email/phone call            KEVIN Mitchell, CNP   Advanced Practice Nurse Navigator- Orthopedics  [facility]   Phone: [phone]

## 2021-10-27 ENCOUNTER — ANCILLARY PROCEDURE (OUTPATIENT)
Dept: ULTRASOUND IMAGING | Facility: CLINIC | Age: 61
End: 2021-10-27
Attending: ANESTHESIOLOGY
Payer: COMMERCIAL

## 2021-10-27 ENCOUNTER — APPOINTMENT (OUTPATIENT)
Dept: RADIOLOGY | Facility: CLINIC | Age: 61
End: 2021-10-27
Attending: ORTHOPAEDIC SURGERY
Payer: COMMERCIAL

## 2021-10-27 ENCOUNTER — ANESTHESIA EVENT (OUTPATIENT)
Dept: SURGERY | Facility: CLINIC | Age: 61
End: 2021-10-27
Payer: COMMERCIAL

## 2021-10-27 ENCOUNTER — HOSPITAL ENCOUNTER (OUTPATIENT)
Facility: CLINIC | Age: 61
Discharge: HOME | End: 2021-10-28
Attending: ORTHOPAEDIC SURGERY | Admitting: ORTHOPAEDIC SURGERY
Payer: COMMERCIAL

## 2021-10-27 ENCOUNTER — ANESTHESIA (OUTPATIENT)
Dept: SURGERY | Facility: CLINIC | Age: 61
End: 2021-10-27
Payer: COMMERCIAL

## 2021-10-27 DIAGNOSIS — Z96.652 HX OF TOTAL KNEE ARTHROPLASTY, LEFT: Primary | ICD-10-CM

## 2021-10-27 PROBLEM — M17.9 OSTEOARTHRITIS, KNEE: Status: ACTIVE | Noted: 2021-10-27

## 2021-10-27 PROCEDURE — C1776 JOINT DEVICE (IMPLANTABLE): HCPCS | Performed by: ORTHOPAEDIC SURGERY

## 2021-10-27 PROCEDURE — 250N000011 HC RX IP 250 OP 636: Performed by: PHYSICIAN ASSISTANT

## 2021-10-27 PROCEDURE — 710N000010 HC RECOVERY PHASE 1, LEVEL 2, PER MIN: Performed by: ORTHOPAEDIC SURGERY

## 2021-10-27 PROCEDURE — 258N000003 HC RX IP 258 OP 636: Performed by: ORTHOPAEDIC SURGERY

## 2021-10-27 PROCEDURE — 250N000011 HC RX IP 250 OP 636: Performed by: NURSE ANESTHETIST, CERTIFIED REGISTERED

## 2021-10-27 PROCEDURE — 73560 X-RAY EXAM OF KNEE 1 OR 2: CPT | Mod: LT

## 2021-10-27 PROCEDURE — 278N000051 HC OR IMPLANT GENERAL: Performed by: ORTHOPAEDIC SURGERY

## 2021-10-27 PROCEDURE — 999N000141 HC STATISTIC PRE-PROCEDURE NURSING ASSESSMENT: Performed by: ORTHOPAEDIC SURGERY

## 2021-10-27 PROCEDURE — 272N000001 HC OR GENERAL SUPPLY STERILE: Performed by: ORTHOPAEDIC SURGERY

## 2021-10-27 PROCEDURE — 360N000077 HC SURGERY LEVEL 4, PER MIN: Performed by: ORTHOPAEDIC SURGERY

## 2021-10-27 PROCEDURE — 250N000011 HC RX IP 250 OP 636: Performed by: ANESTHESIOLOGY

## 2021-10-27 PROCEDURE — 250N000013 HC RX MED GY IP 250 OP 250 PS 637: Performed by: ORTHOPAEDIC SURGERY

## 2021-10-27 PROCEDURE — 250N000009 HC RX 250: Performed by: ANESTHESIOLOGY

## 2021-10-27 PROCEDURE — 250N000013 HC RX MED GY IP 250 OP 250 PS 637: Performed by: ANESTHESIOLOGY

## 2021-10-27 PROCEDURE — 250N000011 HC RX IP 250 OP 636: Performed by: ORTHOPAEDIC SURGERY

## 2021-10-27 PROCEDURE — 250N000009 HC RX 250: Performed by: PHYSICIAN ASSISTANT

## 2021-10-27 PROCEDURE — 370N000017 HC ANESTHESIA TECHNICAL FEE, PER MIN: Performed by: ORTHOPAEDIC SURGERY

## 2021-10-27 PROCEDURE — 250N000013 HC RX MED GY IP 250 OP 250 PS 637: Performed by: PHYSICIAN ASSISTANT

## 2021-10-27 PROCEDURE — 258N000003 HC RX IP 258 OP 636: Performed by: ANESTHESIOLOGY

## 2021-10-27 DEVICE — IMP PATELLA ZIM KNEE ALL POLLY 32MM 42-5400-000-32: Type: IMPLANTABLE DEVICE | Site: KNEE | Status: FUNCTIONAL

## 2021-10-27 DEVICE — SIMPLEX® HV IS A FAST-SETTING ACRYLIC RESIN FOR USE IN BONE SURGERY. MIXING THE TWO SEPARATE STERILE COMPONENTS PRODUCES A DUCTILE BONE CEMENT WHICH, AFTER HARDENING, FIXES THE IMPLANT AND TRANSFERS STRESSES PRODUCED DURING MOVEMENT EVENLY TO THE BONE. SIMPLEX® HV CEMENT POWDER ALSO CONTAINS INSOLUBLE ZIRCONIUM DIOXIDE AS AN X-RAY CONTRAST MEDIUM. SIMPLEX® HV DOES NOT EMIT A SIGNAL AND DOES NOT POSE A SAFETY RISK IN A MAGNETIC RESONANCE ENVIRONMENT.
Type: IMPLANTABLE DEVICE | Site: KNEE | Status: FUNCTIONAL
Brand: SIMPLEX HV

## 2021-10-27 DEVICE — IMPLANTABLE DEVICE: Type: IMPLANTABLE DEVICE | Site: KNEE | Status: FUNCTIONAL

## 2021-10-27 DEVICE — IMP TIBIAL ZIM PSN NP STM 5DEG SZ DL 42-5320-067-01: Type: IMPLANTABLE DEVICE | Site: KNEE | Status: FUNCTIONAL

## 2021-10-27 RX ORDER — LIDOCAINE 40 MG/G
CREAM TOPICAL
Status: DISCONTINUED | OUTPATIENT
Start: 2021-10-27 | End: 2021-10-28 | Stop reason: HOSPADM

## 2021-10-27 RX ORDER — TRANEXAMIC ACID 650 MG/1
1950 TABLET ORAL ONCE
Status: COMPLETED | OUTPATIENT
Start: 2021-10-27 | End: 2021-10-27

## 2021-10-27 RX ORDER — SODIUM CHLORIDE, SODIUM LACTATE, POTASSIUM CHLORIDE, CALCIUM CHLORIDE 600; 310; 30; 20 MG/100ML; MG/100ML; MG/100ML; MG/100ML
INJECTION, SOLUTION INTRAVENOUS CONTINUOUS
Status: DISCONTINUED | OUTPATIENT
Start: 2021-10-27 | End: 2021-10-27 | Stop reason: HOSPADM

## 2021-10-27 RX ORDER — HYDROMORPHONE HCL IN WATER/PF 6 MG/30 ML
0.2 PATIENT CONTROLLED ANALGESIA SYRINGE INTRAVENOUS EVERY 5 MIN PRN
Status: DISCONTINUED | OUTPATIENT
Start: 2021-10-27 | End: 2021-10-27 | Stop reason: HOSPADM

## 2021-10-27 RX ORDER — CEFAZOLIN SODIUM 2 G/100ML
2 INJECTION, SOLUTION INTRAVENOUS EVERY 8 HOURS
Status: COMPLETED | OUTPATIENT
Start: 2021-10-27 | End: 2021-10-28

## 2021-10-27 RX ORDER — ONDANSETRON 4 MG/1
4 TABLET, ORALLY DISINTEGRATING ORAL EVERY 30 MIN PRN
Status: DISCONTINUED | OUTPATIENT
Start: 2021-10-27 | End: 2021-10-27 | Stop reason: HOSPADM

## 2021-10-27 RX ORDER — NALOXONE HYDROCHLORIDE 0.4 MG/ML
0.2 INJECTION, SOLUTION INTRAMUSCULAR; INTRAVENOUS; SUBCUTANEOUS
Status: DISCONTINUED | OUTPATIENT
Start: 2021-10-27 | End: 2021-10-28 | Stop reason: HOSPADM

## 2021-10-27 RX ORDER — FLUTICASONE PROPIONATE 50 MCG
1 SPRAY, SUSPENSION (ML) NASAL DAILY
COMMUNITY

## 2021-10-27 RX ORDER — OXYCODONE HYDROCHLORIDE 5 MG/1
10 TABLET ORAL EVERY 4 HOURS PRN
Status: DISCONTINUED | OUTPATIENT
Start: 2021-10-27 | End: 2021-10-28 | Stop reason: HOSPADM

## 2021-10-27 RX ORDER — POLYETHYLENE GLYCOL 3350 17 G/17G
17 POWDER, FOR SOLUTION ORAL DAILY
Status: DISCONTINUED | OUTPATIENT
Start: 2021-10-28 | End: 2021-10-28 | Stop reason: HOSPADM

## 2021-10-27 RX ORDER — FENTANYL CITRATE 50 UG/ML
50 INJECTION, SOLUTION INTRAMUSCULAR; INTRAVENOUS
Status: COMPLETED | OUTPATIENT
Start: 2021-10-27 | End: 2021-10-27

## 2021-10-27 RX ORDER — BUPIVACAINE HYDROCHLORIDE AND EPINEPHRINE 5; 5 MG/ML; UG/ML
INJECTION, SOLUTION PERINEURAL
Status: COMPLETED | OUTPATIENT
Start: 2021-10-27 | End: 2021-10-27

## 2021-10-27 RX ORDER — CEFAZOLIN SODIUM 2 G/100ML
2 INJECTION, SOLUTION INTRAVENOUS SEE ADMIN INSTRUCTIONS
Status: DISCONTINUED | OUTPATIENT
Start: 2021-10-27 | End: 2021-10-27 | Stop reason: HOSPADM

## 2021-10-27 RX ORDER — PROPOFOL 10 MG/ML
INJECTION, EMULSION INTRAVENOUS PRN
Status: DISCONTINUED | OUTPATIENT
Start: 2021-10-27 | End: 2021-10-27

## 2021-10-27 RX ORDER — ONDANSETRON 2 MG/ML
4 INJECTION INTRAMUSCULAR; INTRAVENOUS EVERY 30 MIN PRN
Status: DISCONTINUED | OUTPATIENT
Start: 2021-10-27 | End: 2021-10-27 | Stop reason: HOSPADM

## 2021-10-27 RX ORDER — HYDROMORPHONE HCL IN WATER/PF 6 MG/30 ML
0.2 PATIENT CONTROLLED ANALGESIA SYRINGE INTRAVENOUS
Status: DISCONTINUED | OUTPATIENT
Start: 2021-10-27 | End: 2021-10-28 | Stop reason: HOSPADM

## 2021-10-27 RX ORDER — SODIUM CHLORIDE, SODIUM LACTATE, POTASSIUM CHLORIDE, CALCIUM CHLORIDE 600; 310; 30; 20 MG/100ML; MG/100ML; MG/100ML; MG/100ML
INJECTION, SOLUTION INTRAVENOUS CONTINUOUS
Status: DISCONTINUED | OUTPATIENT
Start: 2021-10-27 | End: 2021-10-28 | Stop reason: HOSPADM

## 2021-10-27 RX ORDER — HYDROMORPHONE HCL IN WATER/PF 6 MG/30 ML
0.4 PATIENT CONTROLLED ANALGESIA SYRINGE INTRAVENOUS
Status: DISCONTINUED | OUTPATIENT
Start: 2021-10-27 | End: 2021-10-28 | Stop reason: HOSPADM

## 2021-10-27 RX ORDER — OXYCODONE HYDROCHLORIDE 5 MG/1
5 TABLET ORAL EVERY 4 HOURS PRN
Status: DISCONTINUED | OUTPATIENT
Start: 2021-10-27 | End: 2021-10-28 | Stop reason: HOSPADM

## 2021-10-27 RX ORDER — OXYCODONE HYDROCHLORIDE 5 MG/1
5 TABLET ORAL EVERY 4 HOURS PRN
Status: DISCONTINUED | OUTPATIENT
Start: 2021-10-27 | End: 2021-10-27 | Stop reason: HOSPADM

## 2021-10-27 RX ORDER — ONDANSETRON 2 MG/ML
INJECTION INTRAMUSCULAR; INTRAVENOUS PRN
Status: DISCONTINUED | OUTPATIENT
Start: 2021-10-27 | End: 2021-10-27

## 2021-10-27 RX ORDER — DEXAMETHASONE SODIUM PHOSPHATE 10 MG/ML
INJECTION, SOLUTION INTRAMUSCULAR; INTRAVENOUS PRN
Status: DISCONTINUED | OUTPATIENT
Start: 2021-10-27 | End: 2021-10-27

## 2021-10-27 RX ORDER — BISACODYL 10 MG
10 SUPPOSITORY, RECTAL RECTAL DAILY PRN
Status: DISCONTINUED | OUTPATIENT
Start: 2021-10-27 | End: 2021-10-28 | Stop reason: HOSPADM

## 2021-10-27 RX ORDER — ACETAMINOPHEN 325 MG/1
975 TABLET ORAL ONCE
Status: COMPLETED | OUTPATIENT
Start: 2021-10-27 | End: 2021-10-27

## 2021-10-27 RX ORDER — ONDANSETRON 2 MG/ML
4 INJECTION INTRAMUSCULAR; INTRAVENOUS EVERY 6 HOURS PRN
Status: DISCONTINUED | OUTPATIENT
Start: 2021-10-27 | End: 2021-10-28 | Stop reason: HOSPADM

## 2021-10-27 RX ORDER — PROCHLORPERAZINE MALEATE 10 MG
10 TABLET ORAL EVERY 6 HOURS PRN
Status: DISCONTINUED | OUTPATIENT
Start: 2021-10-27 | End: 2021-10-28 | Stop reason: HOSPADM

## 2021-10-27 RX ORDER — ACETAMINOPHEN 325 MG/1
975 TABLET ORAL EVERY 8 HOURS
Status: DISCONTINUED | OUTPATIENT
Start: 2021-10-27 | End: 2021-10-28 | Stop reason: HOSPADM

## 2021-10-27 RX ORDER — ONDANSETRON 4 MG/1
4 TABLET, ORALLY DISINTEGRATING ORAL EVERY 6 HOURS PRN
Status: DISCONTINUED | OUTPATIENT
Start: 2021-10-27 | End: 2021-10-28 | Stop reason: HOSPADM

## 2021-10-27 RX ORDER — FEXOFENADINE HCL 180 MG/1
180 TABLET ORAL DAILY
Status: DISCONTINUED | OUTPATIENT
Start: 2021-10-27 | End: 2021-10-28 | Stop reason: HOSPADM

## 2021-10-27 RX ORDER — FENTANYL CITRATE 50 UG/ML
25 INJECTION, SOLUTION INTRAMUSCULAR; INTRAVENOUS EVERY 5 MIN PRN
Status: DISCONTINUED | OUTPATIENT
Start: 2021-10-27 | End: 2021-10-27 | Stop reason: HOSPADM

## 2021-10-27 RX ORDER — NALOXONE HYDROCHLORIDE 0.4 MG/ML
0.4 INJECTION, SOLUTION INTRAMUSCULAR; INTRAVENOUS; SUBCUTANEOUS
Status: DISCONTINUED | OUTPATIENT
Start: 2021-10-27 | End: 2021-10-28 | Stop reason: HOSPADM

## 2021-10-27 RX ORDER — AMOXICILLIN 250 MG
1 CAPSULE ORAL 2 TIMES DAILY
Status: DISCONTINUED | OUTPATIENT
Start: 2021-10-27 | End: 2021-10-28 | Stop reason: HOSPADM

## 2021-10-27 RX ORDER — FLUTICASONE PROPIONATE 50 MCG
1 SPRAY, SUSPENSION (ML) NASAL DAILY
Status: DISCONTINUED | OUTPATIENT
Start: 2021-10-28 | End: 2021-10-28 | Stop reason: HOSPADM

## 2021-10-27 RX ORDER — CLOBETASOL PROPIONATE 0.5 MG/G
CREAM TOPICAL 2 TIMES DAILY PRN
Status: DISCONTINUED | OUTPATIENT
Start: 2021-10-27 | End: 2021-10-28 | Stop reason: HOSPADM

## 2021-10-27 RX ORDER — LISINOPRIL 10 MG/1
10 TABLET ORAL DAILY
Status: DISCONTINUED | OUTPATIENT
Start: 2021-10-27 | End: 2021-10-28 | Stop reason: HOSPADM

## 2021-10-27 RX ORDER — CEFAZOLIN SODIUM 2 G/100ML
2 INJECTION, SOLUTION INTRAVENOUS
Status: COMPLETED | OUTPATIENT
Start: 2021-10-27 | End: 2021-10-27

## 2021-10-27 RX ORDER — MAGNESIUM SULFATE 4 G/50ML
4 INJECTION INTRAVENOUS ONCE
Status: COMPLETED | OUTPATIENT
Start: 2021-10-27 | End: 2021-10-28

## 2021-10-27 RX ORDER — PROPOFOL 10 MG/ML
INJECTION, EMULSION INTRAVENOUS CONTINUOUS PRN
Status: DISCONTINUED | OUTPATIENT
Start: 2021-10-27 | End: 2021-10-27

## 2021-10-27 RX ORDER — LIDOCAINE 40 MG/G
CREAM TOPICAL
Status: DISCONTINUED | OUTPATIENT
Start: 2021-10-27 | End: 2021-10-27 | Stop reason: HOSPADM

## 2021-10-27 RX ORDER — ACETAMINOPHEN 325 MG/1
650 TABLET ORAL EVERY 4 HOURS PRN
Status: DISCONTINUED | OUTPATIENT
Start: 2021-10-30 | End: 2021-10-28 | Stop reason: HOSPADM

## 2021-10-27 RX ADMIN — OXYCODONE HYDROCHLORIDE 5 MG: 5 TABLET ORAL at 18:57

## 2021-10-27 RX ADMIN — ACETAMINOPHEN 975 MG: 325 TABLET ORAL at 13:12

## 2021-10-27 RX ADMIN — CEFAZOLIN SODIUM 2 G: 2 INJECTION, SOLUTION INTRAVENOUS at 21:36

## 2021-10-27 RX ADMIN — DOCUSATE SODIUM 50MG AND SENNOSIDES 8.6MG 1 TABLET: 8.6; 5 TABLET, FILM COATED ORAL at 21:36

## 2021-10-27 RX ADMIN — LISINOPRIL 10 MG: 10 TABLET ORAL at 18:49

## 2021-10-27 RX ADMIN — ONDANSETRON 4 MG: 2 INJECTION INTRAMUSCULAR; INTRAVENOUS at 14:16

## 2021-10-27 RX ADMIN — MIDAZOLAM HYDROCHLORIDE 1 MG: 1 INJECTION, SOLUTION INTRAMUSCULAR; INTRAVENOUS at 13:53

## 2021-10-27 RX ADMIN — BUPIVACAINE HYDROCHLORIDE AND EPINEPHRINE BITARTRATE 15 ML: 5; .005 INJECTION, SOLUTION PERINEURAL at 13:54

## 2021-10-27 RX ADMIN — FEXOFENADINE HYDROCHLORIDE 180 MG: 180 TABLET ORAL at 18:49

## 2021-10-27 RX ADMIN — PROPOFOL 30 MG: 10 INJECTION, EMULSION INTRAVENOUS at 14:19

## 2021-10-27 RX ADMIN — ACETAMINOPHEN 975 MG: 325 TABLET ORAL at 21:35

## 2021-10-27 RX ADMIN — MAGNESIUM SULFATE HEPTAHYDRATE 4 G: 80 INJECTION, SOLUTION INTRAVENOUS at 13:39

## 2021-10-27 RX ADMIN — FENTANYL CITRATE 50 MCG: 50 INJECTION, SOLUTION INTRAMUSCULAR; INTRAVENOUS at 13:55

## 2021-10-27 RX ADMIN — ASPIRIN 325 MG: 325 TABLET, COATED ORAL at 18:49

## 2021-10-27 RX ADMIN — PROPOFOL 50 MCG/KG/MIN: 10 INJECTION, EMULSION INTRAVENOUS at 14:23

## 2021-10-27 RX ADMIN — TRANEXAMIC ACID 1950 MG: 650 TABLET ORAL at 13:12

## 2021-10-27 RX ADMIN — FENTANYL CITRATE 50 MCG: 50 INJECTION, SOLUTION INTRAMUSCULAR; INTRAVENOUS at 13:52

## 2021-10-27 RX ADMIN — MEPIVACAINE HYDROCHLORIDE 2.5 ML: 20 INJECTION, SOLUTION EPIDURAL; INFILTRATION at 14:22

## 2021-10-27 RX ADMIN — SODIUM CHLORIDE, POTASSIUM CHLORIDE, SODIUM LACTATE AND CALCIUM CHLORIDE: 600; 310; 30; 20 INJECTION, SOLUTION INTRAVENOUS at 13:29

## 2021-10-27 RX ADMIN — CEFAZOLIN SODIUM 2 G: 2 INJECTION, SOLUTION INTRAVENOUS at 14:13

## 2021-10-27 RX ADMIN — PROPOFOL 20 MG: 10 INJECTION, EMULSION INTRAVENOUS at 14:26

## 2021-10-27 RX ADMIN — PROPOFOL 30 MG: 10 INJECTION, EMULSION INTRAVENOUS at 14:16

## 2021-10-27 RX ADMIN — DEXAMETHASONE SODIUM PHOSPHATE 10 MG: 10 INJECTION, SOLUTION INTRAMUSCULAR; INTRAVENOUS at 14:16

## 2021-10-27 RX ADMIN — SODIUM CHLORIDE, POTASSIUM CHLORIDE, SODIUM LACTATE AND CALCIUM CHLORIDE: 600; 310; 30; 20 INJECTION, SOLUTION INTRAVENOUS at 18:52

## 2021-10-27 NOTE — OP NOTE
Preoperative diagnosis: Right knee osteoarthrosis.     Postoperative diagnosis: Right knee osteoarthrosis end-stage    Procedure performed: Right total knee arthroplasty    Surgeon: Kendell Grady M.D.    First assistant: Areli Ferrara was used in this case to assist in retraction, bony cuts, trial implantation, and permanent implantation of total knee arthroplasty as well as wound closure and dressing placement.    Anesthesia: Spinal    Complications: None    Estimated blood loss: 50 mL    Findings: Grade 4 3C OA     Implants Used:     Emeka Persona size 9 right CR femur, size G right the stemmed tibia, 12mm CR polyethylene, 35mm patella.    Indications:    This patient has severe pain secondary to severe right knee osteoarthrosis.  This patient has failed or elected to forego nonoperative care including but not limited to physical therapy, injections, and pain control with medication.  This patient has significant pain affecting quality of life.  After understanding risks, benefits, alternatives, potential outcomes, the nature of the procedure, and rehabilitation the patient undergo right total knee arthroplasty.    Procedure:    The patient was identified in the preoperative holding area and the right knee was marked as the operative site.  The patient was brought to the operating suite where spinal anesthesia was provided by the anesthesia staff.  The right lower extremity was prepped and draped in the standard sterile fashion.  A timeout was performed.  The patient received antibiotics prior to incision.  We exsanguinated and elevated the thigh tourniquet to 300 mmHg.    A midline incision was made for a medial parapatellar approach.  The fat pad and ACL were excised.  An intramedullary guide was used to align the distal femoral cut at 5  valgus.  The 4-in-1 cutting block was sized and stabilized for the remainder of the distal femoral resections.  Next an extra medullary tibial  cutting guide was used to resect the proximal tibia in neutral alignment.  Posterior osteophytes and the menisci were excised.  We undercut the patella and reamed for the patella.  The above-noted implants were trialed.  The knee had excellent stability from 0-90  of knee flexion with range of motion 0 to 130.  The knee was stable varus valgus stress at 0 and 90 .  Equal flexion extension gaps.  Excellent tracking of the patella with the no hands test.    The trial implants were removed.  We reamed and broached for the femur and the tibia.  The bone was copiously irrigated and dried.  We cemented our implants and the place.  Cement was used.  We allowed the cement to dry and removed excess cement.  The tourniquet was deflated.  We copiously irrigated.  Hemostasis was achieved.  A layered closure was performed. Sterile dressings were placed.    The patient will be admitted to the hospital status post right total knee arthroplasty for postoperative pain control, DVT prophylaxis, physical therapy, and medical management.  The patient is weightbearing as tolerated.  The patient will be on ASA 325mg po daily x 6 weeks.  Estimated length of stay is 1 night.      Return to clinic in 2 weeks for reevaluation and x-rays.        Kendell Grady MD

## 2021-10-27 NOTE — ANESTHESIA PROCEDURE NOTES
Adductor canal Procedure Note    Pre-Procedure   Staff -        Anesthesiologist:  Leo Gibson MD       Performed By: anesthesiologist       Location: pre-op       Procedure Start/Stop Times: 10/27/2021 1:44 PM and 10/27/2021 1:54 PM       Pre-Anesthestic Checklist: patient identified, IV checked, site marked, risks and benefits discussed, informed consent, monitors and equipment checked, pre-op evaluation, at physician/surgeon's request and post-op pain management  Timeout:       Correct Patient: Yes        Correct Procedure: Yes        Correct Site: Yes        Correct Position: Yes        Correct Laterality: Yes        Site Marked: Yes  Procedure Documentation  Procedure: Adductor canal       Laterality: left       Patient Position: supine       Patient Prep/Sterile Barriers: sterile gloves, mask       Skin prep: Chloraprep       Needle Type: short bevel       Needle Gauge: 21.        Needle Length (Inches): 4        Ultrasound guided       1. Ultrasound was used to identify targeted nerve, plexus, vascular marker, or fascial plane and place a needle adjacent to it in real-time.       2. Ultrasound was used to visualize the spread of anesthetic in close proximity to the above referenced structure.       3. A permanent image is entered into the patient's record.       4. The visualized anatomic structures appeared normal.       5. There were no apparent abnormal pathologic findings.    Assessment/Narrative         The placement was negative for: blood aspirated, painful injection and site bleeding       Paresthesias: No.     Bolus given via needle..        Secured via.        Insertion/Infusion Method: Single Shot       Complications: none       Injection made incrementally with aspirations every 5 mL.    Medication(s) Administered   Bupivacaine 0.5% w/ 1:200K Epi (Other), 15 mL  Medication Administration Time: 10/27/2021 1:54 PM

## 2021-10-27 NOTE — BRIEF OP NOTE
[facility]    Brief Operative Note    Pre-operative diagnosis: Knee osteoarthritis [M17.10] left   Post-operative diagnosis Same as pre-operative diagnosis    Procedure: Procedure(s):  LEFT TOTAL KNEE ARTHROPLASTY  Surgeon: Surgeon(s) and Role:     * Kendell Grady MD - Primary     * Areli Ferrara PA-C - Assisting  Anesthesia: Spinal   Estimated Blood Loss: Less than 50 ml    Drains: None  Specimens: * No specimens in log *  Findings:   Grade 4 3C OA.  Complications: None.  Implants:   Implant Name Type Inv. Item Serial No.  Lot No. LRB No. Used Action   BONE CEMENT RADIOPAQUE SIMPLEX HV FULL DOSE 6194-1-001 - [id number] Cement, Bone BONE CEMENT RADIOPAQUE SIMPLEX HV FULL DOSE 6194-1-001  [employer] [id number] Left 1 Implanted   IMP PATELLA ZIM KNEE ALL RUPA 32MM -649-32 - [id number] Total Joint Component/Insert IMP PATELLA ZIM KNEE ALL RUPA 32MM -425-32  [employer] [id number] Left 1 Implanted   KNEE FEMUR CR CEMENT CCR WHIT SZ 7 L - [id number] Total Joint Component/Insert KNEE FEMUR CR CEMENT CCR WHIT SZ 7 L  [employer] [id number] Left 1 Implanted   INSERT TIBIAL ASF CR 10MM VE L 6-7 - [id number] Total Joint Component/Insert INSERT TIBIAL ASF CR 10MM VE L 6-7  [employer] [id number] Left 1 Implanted   IMP TIBIAL ZIM PSN NP STM 5DEG SZ DL -304-01 - [id number] Total Joint Component/Insert IMP TIBIAL ZIM PSN NP STM 5DEG SZ DL -609-01  [employer] [id number] Left 1 Implanted

## 2021-10-27 NOTE — ANESTHESIA PROCEDURE NOTES
Intrathecal injection Procedure Note    Pre-Procedure   Staff -        Anesthesiologist:  Leo Gibson MD       Performed By: anesthesiologist       Location: OR       Procedure Start/Stop Times: 10/27/2021 2:18 PM and 10/27/2021 2:22 PM       Pre-Anesthestic Checklist: patient identified, IV checked, risks and benefits discussed, informed consent, monitors and equipment checked and pre-op evaluation  Timeout:       Correct Patient: Yes        Correct Procedure: Yes        Correct Site: Yes        Correct Position: Yes   Procedure Documentation  Procedure: intrathecal injection       Patient Position: sitting       Patient Prep/Sterile Barriers: sterile gloves, patient draped       Skin prep: Chloraprep       Insertion Site: L3-4. (midline approach).       Needle Gauge: 25.        Needle Length (Inches): 3.5        Spinal Needle Type: Pencan       Introducer used       Introducer: 20 G       # of attempts: 1 and  # of redirects:  0    Assessment/Narrative         Paresthesias: No.       CSF fluid: clear.    Medication(s) Administered   2% Mepivacaine PF (Intrathecal), 2.5 mL  Medication Administration Time: 10/27/2021 2:22 PM

## 2021-10-27 NOTE — ANESTHESIA POSTPROCEDURE EVALUATION
Patient: Lissette Wong    Procedure: Procedure(s):  LEFT TOTAL KNEE ARTHROPLASTY       Diagnosis:Knee osteoarthritis [M17.10]  Diagnosis Additional Information: No value filed.    Anesthesia Type:  Spinal    Note:  Disposition: Inpatient   Postop Pain Control: Uneventful            Sign Out: Well controlled pain   PONV: No   Neuro/Psych: Uneventful            Sign Out: Acceptable/Baseline neuro status   Airway/Respiratory: Uneventful            Sign Out: Acceptable/Baseline resp. status   CV/Hemodynamics: Uneventful            Sign Out: Acceptable CV status; No obvious hypovolemia; No obvious fluid overload   Other NRE: NONE   DID A NON-ROUTINE EVENT OCCUR? No           Last vitals:  Vitals Value Taken Time   /64 10/27/21 1617   Temp 36.7  C (98  F) 10/27/21 1540   Pulse 65 10/27/21 1617   Resp 18 10/27/21 1617   SpO2 97 % 10/27/21 1617       Electronically Signed By: Juan Manuel Woodall MD  October 27, 2021  4:53 PM

## 2021-10-27 NOTE — ANESTHESIA CARE TRANSFER NOTE
Patient: Lissette Wong    Procedure: Procedure(s):  LEFT TOTAL KNEE ARTHROPLASTY       Diagnosis: Knee osteoarthritis [M17.10]  Diagnosis Additional Information: No value filed.    Anesthesia Type:   Spinal     Note:    Oropharynx: oropharynx clear of all foreign objects  Level of Consciousness: awake  Oxygen Supplementation: face mask  Level of Supplemental Oxygen (L/min / FiO2): 6  Independent Airway: airway patency satisfactory and stable  Dentition: dentition unchanged  Vital Signs Stable: post-procedure vital signs reviewed and stable  Report to RN Given: handoff report given  Patient transferred to: PACU    Handoff Report: Identifed the Patient, Identified the Reponsible Provider, Reviewed the pertinent medical history, Discussed the surgical course, Reviewed Intra-OP anesthesia mangement and issues during anesthesia, Set expectations for post-procedure period and Allowed opportunity for questions and acknowledgement of understanding      Vitals:  Vitals Value Taken Time   /67 10/27/21 1527   Temp 36.8  C (98.2  F) 10/27/21 1525   Pulse 77 10/27/21 1528   Resp 24 10/27/21 1528   SpO2 97 % 10/27/21 1528   Vitals shown include unvalidated device data.    Electronically Signed By: KEVIN Fregoso CRNA  October 27, 2021  3:28 PM

## 2021-10-27 NOTE — OP NOTE
Preoperative diagnosis: Left knee osteoarthrosis.     Postoperative diagnosis: Left knee osteoarthrosis end-stage    Procedure performed: Left total knee arthroplasty    Surgeon: Kendell Grady M.D.    First assistant: Areli Ferrara was used in this case to assist in retraction, bony cuts, trial implantation, and permanent implantation of total knee arthroplasty as well as wound closure and dressing placement.    Anesthesia: Spinal    Complications: None    Estimated blood loss: 50 mL    Findings: Grade 4 OA     Implants Used:     Emeka Persona size 7 narrow Left CR femur, size D Left the stemmed tibia, 10mm CR polyethylene, 32mm patella.    Indications:    This patient has severe pain secondary to severe left knee osteoarthrosis.  This patient has failed or elected to forego nonoperative care including but not limited to physical therapy, injections, and pain control with medication.  This patient has significant pain affecting quality of life.  After understanding risks, benefits, alternatives, potential outcomes, the nature of the procedure, and rehabilitation the patient undergo left total knee arthroplasty.    Procedure:    The patient was identified in the preoperative holding area and the left knee was marked as the operative site.  The patient was brought to the operating suite where spinal anesthesia was provided by the anesthesia staff.  The left lower extremity was prepped and draped in the standard sterile fashion.  A timeout was performed.  The patient received antibiotics prior to incision.  We exsanguinated and elevated the thigh tourniquet to 300 mmHg.    A midline incision was made for a medial parapatellar approach.  The fat pad and ACL were excised.  An intramedullary guide was used to align the distal femoral cut at 5  valgus.  The 4-in-1 cutting block was sized and stabilized for the remainder of the distal femoral resections.  Next an extra medullary tibial cutting  guide was used to resect the proximal tibia in neutral alignment.  Posterior osteophytes and the menisci were excised.  We undercut the patella and reamed for the patella.  The above-noted implants were trialed.  The knee had excellent stability from 0-90  of knee flexion with range of motion 0 to 130.  The knee was stable varus valgus stress at 0 and 90 .  Equal flexion extension gaps.  Excellent tracking of the patella with the no hands test.    The trial implants were removed.  We reamed and broached for the femur and the tibia.  The bone was copiously irrigated and dried.  We cemented our implants and the place.  Cement was used.  We allowed the cement to dry and removed excess cement.  The tourniquet was deflated.  We copiously irrigated.  Hemostasis was achieved.  A layered closure was performed. Sterile dressings were placed.    The patient will be admitted to the hospital status post left total knee arthroplasty for postoperative pain control, DVT prophylaxis, physical therapy, and medical management.  The patient is weightbearing as tolerated.  The patient will be on ASA 325mg po daily x 6 weeks.  Estimated length of stay is 1 midnights.      Return to clinic in 2 weeks for reevaluation and x-rays.        Kendell Grady MD

## 2021-10-27 NOTE — ANESTHESIA PREPROCEDURE EVALUATION
Anesthesia Pre-Procedure Evaluation    Patient: Lissette Wong   MRN: [id number] : 1960        Preoperative Diagnosis: OA (osteoarthritis) of knee [M17.10]   Procedure : Procedure(s):  RIGHT TOTAL KNEE ARTHROPLASTY     Past Medical History:   Diagnosis Date     Antiplatelet or antithrombotic long-term use      Arthritis      Cerebral artery occlusion with cerebral infarction (H)          History of blood transfusion      Hyperlipidemia      Hypertension      Migraines      Motion sickness     on cruise, baack seat of car     Obesity      Overactive bladder      Subdural hemorrhage (H)      TIA (transient ischemic attack) 2021      Past Surgical History:   Procedure Laterality Date     ARTHROPLASTY KNEE Right 2021    Procedure: RIGHT TOTAL KNEE ARTHROPLASTY;  Surgeon: Kendell Grady MD;  Location: [facility]     CHOLECYSTECTOMY       HYSTERECTOMY       REPAIR HAMMER TOE Bilateral 2018    Procedure: HAMMER TOE CORRECTION SECOND TOE BOTH FEET ,EXTENSOR TENOTOMY BOTH TOES;  Surgeon: Yasir Andrews DPM;  Location: [facility];  Service:       Allergies   Allergen Reactions     Morphine Anaphylaxis     In  Hives, throat swells and can't breath.  Since then has tolerated oxycodone and codeine without issue.      Social History     Tobacco Use     Smoking status: Never Smoker     Smokeless tobacco: Never Used   Substance Use Topics     Alcohol use: Yes     Alcohol/week: 1.0 standard drinks     Comment: 1 per week      Wt Readings from Last 1 Encounters:   10/27/21 95.7 kg (210 lb 14.4 oz)        Anesthesia Evaluation   Pt has had prior anesthetic. Type: Regional.    No history of anesthetic complications       ROS/MED HX  ENT/Pulmonary:       Neurologic:     (+) CVA, TIA,     Cardiovascular:     (+) hypertension-----Taking blood thinners     METS/Exercise Tolerance:     Hematologic:       Musculoskeletal:       GI/Hepatic:       Renal/Genitourinary:        Endo:     (+) Obesity,     Psychiatric/Substance Use:       Infectious Disease:       Malignancy:       Other:            Physical Exam    Airway        Mallampati: II   TM distance: > 3 FB   Neck ROM: full     Respiratory Devices and Support         Dental  no notable dental history         Cardiovascular   cardiovascular exam normal          Pulmonary   pulmonary exam normal                OUTSIDE LABS:  CBC:   Lab Results   Component Value Date    WBC 5.8 10/11/2021    WBC 6.3 06/24/2021    HGB 13.1 10/11/2021    HGB 9.7 (L) 07/15/2021    HCT 41.3 10/11/2021    HCT 40.9 06/24/2021     10/11/2021     06/24/2021     BMP:   Lab Results   Component Value Date     10/11/2021     06/24/2021    POTASSIUM 4.4 10/11/2021    POTASSIUM 4.7 06/24/2021    CHLORIDE 104 10/11/2021    CHLORIDE 106 06/24/2021    CO2 26 10/11/2021    CO2 24 06/24/2021    BUN 12 10/11/2021    BUN 14 06/24/2021    CR 0.71 10/11/2021    CR 0.76 06/24/2021    GLC 99 10/11/2021    GLC 94 06/24/2021     COAGS:   Lab Results   Component Value Date    PTT 27 02/07/2021    INR 0.99 02/07/2021     POC:   Lab Results   Component Value Date     (H) 02/08/2021     HEPATIC: No results found for: ALBUMIN, PROTTOTAL, ALT, AST, GGT, ALKPHOS, BILITOTAL, BILIDIRECT, HEATHER  OTHER:   Lab Results   Component Value Date    A1C 5.4 02/07/2021    IFEANYI 10.2 10/11/2021    MAG 1.8 02/07/2021    TSH 1.64 02/07/2021    CRP <2.9 02/09/2021    SED 16 02/09/2021       Anesthesia Plan    ASA Status:  3      Anesthesia Type: Spinal.      Maintenance: TIVA.        Consents    Anesthesia Plan(s) and associated risks, benefits, and realistic alternatives discussed. Questions answered and patient/representative(s) expressed understanding.     - Discussed with:  Patient      - Extended Intubation/Ventilatory Support Discussed: No.      - Patient is DNR/DNI Status: No    Use of blood products discussed: Yes.     - Discussed with: Patient.     -  Consented: consented to blood products            Reason for refusal: other.     Postoperative Care    Pain management: IV analgesics, Oral pain medications, Multi-modal analgesia, Peripheral nerve block (Single Shot).   PONV prophylaxis: Dexamethasone or Solumedrol, Ondansetron (or other 5HT-3), Scopolamine patch     Comments:    Saphenous nerve block for POP per surgeon request.  Scopolamine, Decadron, Zofran.  Diprivan infusion.  Ketamine (0.5 mg/kg).  Magnesium.                Leo Gibson MD

## 2021-10-27 NOTE — PHARMACY-ADMISSION MEDICATION HISTORY
Pharmacy Note - Admission Medication History    Pertinent Provider Information: none     ______________________________________________________________________    Prior To Admission (PTA) med list completed and updated in EMR.       PTA Med List   Medication Sig Last Dose     acetaminophen (TYLENOL) 500 MG tablet Take 1-2 tablets (500-1,000 mg) by mouth every 6 hours (Patient taking differently: Take 500-1,000 mg by mouth every 6 hours q6hprn) 10/26/2021 at Unknown time     Acidophilus Lactobacillus CAPS Take 3 capsules by mouth At Bedtime [employer] Brand Past Week at Unknown time     aspirin (ASA) 325 MG EC tablet Take 325 mg by mouth daily Stopped 10/20/21 10/20/2021     clobetasol (TEMOVATE) 0.05 % external cream Apply topically 2 times daily as needed Past Week at Unknown time     fexofenadine (ALLEGRA) 180 MG tablet Take 180 mg by mouth daily  10/26/2021 at Unknown time     fish oil-omega-3 fatty acids 1000 MG capsule Take 2 g by mouth daily Stopped 10/20/21 10/20/2021     fluticasone (FLONASE) 50 MCG/ACT nasal spray Spray 1 spray into both nostrils daily 10/26/2021 at Unknown time     Glucosamine-Chondroitin 250-200 MG TABS Take 2 tablets by mouth daily Stopped 10/20/21 10/20/2021     lisinopril (ZESTRIL) 10 MG tablet Take 1 tablet (10 mg) by mouth daily 10/26/2021 at Unknown time     Multiple Vitamins-Minerals (AIRBORNE GUMMIES PO) Take 3 chew tab by mouth daily Past Week at Unknown time     red yeast rice 600 MG CAPS Take 600 mg by mouth daily  Past Week at Unknown time     vitamin C (ASCORBIC ACID) 250 MG tablet Take 250 mg by mouth daily Past Week at Unknown time     Vitamin D3 (CHOLECALCIFEROL) 125 MCG (5000 UT) tablet Take 125 mcg by mouth daily Past Week at Unknown time       Information source(s): Patient  Method of interview communication: in-person    Summary of Changes to PTA Med List  New: flonase      Patient was asked about OTC/herbal products specifically.  PTA med list reflects this.    In  the past week, patient estimated taking medication this percent of the time:  greater than 90%.    Allergies were reviewed, assessed, and updated with the patient.      Patient did not bring any medications to the hospital and can't retrieve from home. No multi-dose medications are available for use during hospital stay.     The information provided in this note is only as accurate as the sources available at the time of the update(s).    Thank you for the opportunity to participate in the care of this patient.    Tyrone Velásquez RPH  10/27/2021 1:23 PM

## 2021-10-27 NOTE — BRIEF OP NOTE
[facility]    Brief Operative Note    Pre-operative diagnosis: Knee osteoarthritis [M17.10] right  Post-operative diagnosis Same as pre-operative diagnosis    Procedure: Procedure(s):  LEFT TOTAL KNEE ARTHROPLASTY  Surgeon: Surgeon(s) and Role:     * Kendell Grady MD - Primary     * Areli Ferrara PA-C - Assisting  Anesthesia: Spinal   Estimated Blood Loss: Less than 50 ml    Drains: None  Specimens: * No specimens in log *  Findings:   3 C OA grade 4  Complications: None.  Implants: * No implants in log *

## 2021-10-28 ENCOUNTER — APPOINTMENT (OUTPATIENT)
Dept: OCCUPATIONAL THERAPY | Facility: CLINIC | Age: 61
End: 2021-10-28
Attending: ORTHOPAEDIC SURGERY
Payer: COMMERCIAL

## 2021-10-28 ENCOUNTER — APPOINTMENT (OUTPATIENT)
Dept: PHYSICAL THERAPY | Facility: CLINIC | Age: 61
End: 2021-10-28
Attending: ORTHOPAEDIC SURGERY
Payer: COMMERCIAL

## 2021-10-28 VITALS
SYSTOLIC BLOOD PRESSURE: 130 MMHG | BODY MASS INDEX: 36.72 KG/M2 | OXYGEN SATURATION: 94 % | HEART RATE: 61 BPM | WEIGHT: 210.9 LBS | DIASTOLIC BLOOD PRESSURE: 73 MMHG | TEMPERATURE: 97.6 F | RESPIRATION RATE: 16 BRPM

## 2021-10-28 LAB
FASTING STATUS PATIENT QL REPORTED: ABNORMAL
GLUCOSE BLD-MCNC: 159 MG/DL (ref 70–125)
HGB BLD-MCNC: 9.9 G/DL (ref 11.7–15.7)

## 2021-10-28 PROCEDURE — 97535 SELF CARE MNGMENT TRAINING: CPT | Mod: GO

## 2021-10-28 PROCEDURE — 97161 PT EVAL LOW COMPLEX 20 MIN: CPT | Mod: GP

## 2021-10-28 PROCEDURE — 250N000011 HC RX IP 250 OP 636: Performed by: ORTHOPAEDIC SURGERY

## 2021-10-28 PROCEDURE — 97110 THERAPEUTIC EXERCISES: CPT | Mod: GP

## 2021-10-28 PROCEDURE — 97166 OT EVAL MOD COMPLEX 45 MIN: CPT | Mod: GO

## 2021-10-28 PROCEDURE — 250N000013 HC RX MED GY IP 250 OP 250 PS 637: Performed by: ORTHOPAEDIC SURGERY

## 2021-10-28 PROCEDURE — 97116 GAIT TRAINING THERAPY: CPT | Mod: GP

## 2021-10-28 PROCEDURE — 36415 COLL VENOUS BLD VENIPUNCTURE: CPT | Performed by: ORTHOPAEDIC SURGERY

## 2021-10-28 PROCEDURE — 82947 ASSAY GLUCOSE BLOOD QUANT: CPT | Performed by: ORTHOPAEDIC SURGERY

## 2021-10-28 PROCEDURE — 85018 HEMOGLOBIN: CPT | Performed by: ORTHOPAEDIC SURGERY

## 2021-10-28 RX ORDER — AMOXICILLIN 250 MG
1 CAPSULE ORAL 2 TIMES DAILY
COMMUNITY
Start: 2021-10-28

## 2021-10-28 RX ORDER — OXYCODONE HYDROCHLORIDE 5 MG/1
5-10 TABLET ORAL EVERY 4 HOURS PRN
Qty: 32 TABLET | Refills: 0 | Status: SHIPPED | OUTPATIENT
Start: 2021-10-28

## 2021-10-28 RX ORDER — POLYETHYLENE GLYCOL 3350 17 G/17G
17 POWDER, FOR SOLUTION ORAL DAILY
Qty: 510 G | COMMUNITY
Start: 2021-10-28

## 2021-10-28 RX ADMIN — POLYETHYLENE GLYCOL 3350 17 G: 17 POWDER, FOR SOLUTION ORAL at 08:17

## 2021-10-28 RX ADMIN — OXYCODONE HYDROCHLORIDE 5 MG: 5 TABLET ORAL at 11:25

## 2021-10-28 RX ADMIN — DOCUSATE SODIUM 50MG AND SENNOSIDES 8.6MG 1 TABLET: 8.6; 5 TABLET, FILM COATED ORAL at 08:17

## 2021-10-28 RX ADMIN — CEFAZOLIN SODIUM 2 G: 2 INJECTION, SOLUTION INTRAVENOUS at 05:02

## 2021-10-28 RX ADMIN — OXYCODONE HYDROCHLORIDE 5 MG: 5 TABLET ORAL at 00:13

## 2021-10-28 RX ADMIN — ACETAMINOPHEN 975 MG: 325 TABLET ORAL at 04:57

## 2021-10-28 RX ADMIN — LISINOPRIL 10 MG: 10 TABLET ORAL at 08:17

## 2021-10-28 RX ADMIN — FEXOFENADINE HYDROCHLORIDE 180 MG: 180 TABLET ORAL at 08:17

## 2021-10-28 RX ADMIN — OXYCODONE HYDROCHLORIDE 5 MG: 5 TABLET ORAL at 06:47

## 2021-10-28 RX ADMIN — FLUTICASONE PROPIONATE 1 SPRAY: 50 SPRAY, METERED NASAL at 08:26

## 2021-10-28 RX ADMIN — ASPIRIN 325 MG: 325 TABLET, COATED ORAL at 08:17

## 2021-10-28 ASSESSMENT — ACTIVITIES OF DAILY LIVING (ADL): IADL_COMMENTS: INDEP. ALL ADLS

## 2021-10-28 NOTE — CONSULTS
"ACUPUNCTURIST TREATMENT NOTE    Name: Lissette Wong  :  1960  MRN:  [id number]    Acupuncture Treatment  Patient Type: Orthopedic  Intervention Reason: Anxiety/Stress  Pre-session Stress/Anxiety ratin  Post-session Stress/Anxiety ratin  Patient complaint:: Anxiety  Initial insertions: Jaspal Mckeon 20, Avril bowie, Lee 17, P 6, (L) scalp nitza x1     \"Risks and benefits of acupuncture were discussed with patient. Consent for treatment was given. We thank you for the referral.\"     Adán Og    Date:  10/28/2021  Time:  10:24 AM    "

## 2021-10-28 NOTE — PROGRESS NOTES
10/28/21 1100   Quick Adds   Quick Adds Certification   Type of Visit Initial PT Evaluation   Living Environment   People in home spouse   Current Living Arrangements house   Home Accessibility stairs to enter home   Number of Stairs, Main Entrance 3   Stair Railings, Main Entrance railing on left side (ascending)   Number of Stairs, Within Home, Primary 10   Stair Railings, Within Home, Primary railing on left side (ascending)   Transportation Anticipated family or friend will provide   Self-Care   Usual Activity Tolerance good   Current Activity Tolerance good   General Information   Onset of Illness/Injury or Date of Surgery 10/27/21   Referring Physician Kendell Grady MD   Patient/Family Therapy Goals Statement (PT) home   Pertinent History of Current Problem (include personal factors and/or comorbidities that impact the POC) s/p L TKA, R TKA 3 months ago   Existing Precautions/Restrictions no pivoting or twisting   Weight-Bearing Status - LLE weight-bearing as tolerated   Weight-Bearing Status - RLE full weight-bearing   Range of Motion (ROM)   ROM Comment decreased ROM s/p L TKA   Strength   Strength Comments decreased strength s/p L TKA   Transfers   Transfers sit-stand transfer   Sit-Stand Transfer   Sit-Stand [place] (Transfers) modified independence   Assistive Device (Sit-Stand Transfers) walker, front-wheeled   Gait/Stairs (Locomotion)   [place] Level (Gait) modified independence   Assistive Device (Gait) walker, front-wheeled   Distance in Feet (Required for LE Total Joints) 200'   Pattern (Gait) step-through   Deviations/Abnormal Patterns (Gait) gait speed decreased;wilbur decreased;antalgic   Negotiation (Stairs) stairs independence;stairs assistive device;handrail location;number of steps;descending technique;ascending technique   [place] Level (Stairs) contact guard   Assistive Device (Stairs) cane, straight   Handrail Location (Stairs) left side (descending)    Number of Steps (Stairs) 8   Ascending Technique (Stairs) step-to-step   Descending Technique (Stairs) step-to-step   Clinical Impression   Criteria for Skilled Therapeutic Intervention yes, treatment indicated   PT Diagnosis (PT) impaired functional mobility   Influenced by the following impairments pain, weakness   Functional limitations due to impairments gait, stairs, transfers   Clinical Presentation Stable/Uncomplicated   Clinical Presentation Rationale pt presents as medically diagnosed   Clinical Decision Making (Complexity) moderate complexity   Therapy Frequency (PT) One time eval and treatment only   Predicted Duration of Therapy Intervention (days/wks) 1 day   Planned Therapy Interventions (PT) gait training;home exercise program;ROM (range of motion);stair training;strengthening;transfer training   Anticipated Equipment Needs at Discharge (PT) walker, rolling   Risk & Benefits of therapy have been explained care plan/treatment goals reviewed;patient   PT Discharge Planning    PT Discharge Recommendation (DC Rec) home with outpatient physical therapy   PT Rationale for DC Rec home wit hassist from spouse as needed, O PPT for TKA rehabilitation   Therapy Certification   Start of care date 10/28/21   Certification date from 10/28/21   Certification date to 11/28/21   Medical Diagnosis OA   Total Evaluation Time   Total Evaluation Time (Minutes) 5

## 2021-10-28 NOTE — PROGRESS NOTES
10/28/21 0825   Quick Adds   Quick Adds Certification   Type of Visit Initial Occupational Therapy Evaluation   Living Environment   People in home spouse   Number of Stairs, Within Home, Primary greater than 10 stairs   Stair Railings, Within Home, Primary railings safe and in good condition   Transportation Anticipated family or friend will provide   Self-Care   Usual Activity Tolerance good   Current Activity Tolerance good   Equipment Currently Used at Home grab bar, toilet;shower chair;raised toilet seat;grab bar, tub/shower  (shoehorn , walk in shower, )   Instrumental Activities of Daily Living (IADL)   IADL Comments indep. all ADLs   Disability/Function   Hearing Difficulty or Deaf no   Wear Glasses or Blind yes   Vision Management glasses   Concentrating, Remembering or Making Decisions Difficulty no   Difficulty Communicating no   Difficulty Eating/Swallowing no   Dressing/Bathing Difficulty no   Toileting issues no   Doing Errands Independently Difficulty (such as shopping) yes   General Information   Onset of Illness/Injury or Date of Surgery 10/27/21   Patient/Family Therapy Goal Statement (OT) home   Existing Precautions/Restrictions fall   Left Lower Extremity (Weight-bearing Status) weight-bearing as tolerated (WBAT)   Right Lower Extremity (Weight-bearing Status) weight-bearing as tolerated (WBAT)   Cognitive Status Examination   Affect/Mental Status (Cognitive) WFL   Visual Perception   Visual Impairment/Limitations corrective lenses full-time   Sensory   Sensory Quick Adds No deficits were identified   Range of Motion Comprehensive   General Range of Motion no range of motion deficits identified   Strength Comprehensive (MMT)   General Manual Muscle Testing (MMT) Assessment no strength deficits identified   Coordination   Upper Extremity Coordination No deficits were identified   Bed Mobility   Bed Mobility supine-sit;sit-supine   Supine-Sit [place] (Bed Mobility) independent   Sit-Supine  [place] (Bed Mobility) independent   Transfers   Transfers bed-chair transfer;toilet transfer;shower transfer;other (see comments)  (car, kitchen mobility)   Transfer Comments car trsf mod I w/ FWW, kitchen mob.-therapist kavin hazel verbalized understanding   Transfer Skill: Bed to Chair/Chair to Bed   Bed-Chair [place] (Transfers) modified independence   Assistive Device (Bed-Chair Transfers) rolling walker   Shower Transfer   Type (Shower Transfer) lateral   [place] Level (Shower Transfer) modified independence   Assistive Device (Shower Transfer) grab bar, tub rail;walker, front-wheeled   Toilet Transfer   Type (Toilet Transfer) sit-stand;stand-sit   [place] Level (Toilet Transfer) modified independence   Assistive Device (Toilet Transfer) walker, front-wheeled   Balance   Balance Assessment standing balance: dynamic   Standing Balance: Dynamic normal balance   Lower Body Dressing Assessment   [place] Level (Lower Body Dressing) modified independence   Position (Lower Body Dressing) supported standing;unsupported sitting   Comment (Lower Body Dressing) donned underwear, pants   Clinical Impression   Criteria for Skilled Therapeutic Interventions Met (OT) yes   OT Diagnosis decreased ADLs   OT Problem List-Impairments impacting ADL mobility   Assessment of Occupational Performance 1-3 Performance Deficits   Identified Performance Deficits krista, trsfs   Planned Therapy Interventions (OT) ADL retraining;transfer training   Clinical Decision Making Complexity (OT) moderate complexity   Therapy Frequency (OT) Daily   Predicted Duration of Therapy 1 day   Anticipated Equipment Needs Upon Discharge (OT)   (none)   Risk & Benefits of therapy have been explained patient   OT Discharge Planning    OT Discharge Recommendation (DC Rec) Home with assist   OT Rationale for DC Rec home w/ family assist as needed   Therapy Certification   Start of Care Date 10/28/21   Certification date from 10/28/21    Certification date to 11/28/21   Medical Diagnosis TKA   Total Evaluation Time (Minutes)   Total Evaluation Time (Minutes) 5

## 2021-10-28 NOTE — PROGRESS NOTES
Discharged to home with spouse. Stated understanding of discharge instructions. Checked room for belongings. Stated that she has her belongings.

## 2021-10-28 NOTE — PLAN OF CARE
Patient vital signs are at baseline: Yes  Patient able to ambulate as they were prior to admission or with assist devices provided by therapies during their stay:  Yes  Patient MUST void prior to discharge:  Yes  Patient able to tolerate oral intake:  Yes  Pain has adequate pain control using Oral analgesics:  Yes     Pt is discharging home with her spouse.

## 2021-10-28 NOTE — DISCHARGE INSTRUCTIONS
Kendell Grady MD    Attending    Since 10/27/2021    [phone]   To be seen in 2 weeks    Humberto Og MD    General - Internal Medicine    [phone]   It is recommended to follow up with your primary care provider in 7 days

## 2021-10-28 NOTE — PLAN OF CARE
Patient vital signs are at baseline: Yes  Patient able to ambulate as they were prior to admission or with assist devices provided by therapies during their stay:  Yes  Patient MUST void prior to discharge:  Yes  Patient able to tolerate oral intake:  Yes  Pain has adequate pain control using Oral analgesics:  Yes    Patient is A&Ox4 and is currently managing pain with oral scheduled and PRN medication. Patient has been able to ambulate around the unit during the shift, and is voiding independently. Plan of care to continue, including possible discharge with authorization from all providers and care teams and continued improvement.

## 2021-10-28 NOTE — PLAN OF CARE
Physical Therapy Discharge Summary    Reason for therapy discharge:    All goals and outcomes met, no further needs identified.    Progress towards therapy goal(s). See goals on Care Plan in [facility] electronic health record for goal details.  Goals met    Therapy recommendation(s):    Continued therapy is recommended.  Rationale/Recommendations:  OP PT for TKA rehabiltiation.

## 2021-10-28 NOTE — PROGRESS NOTES
[facility]      OUTPATIENT OCCUPATIONAL THERAPY  EVALUATION  PLAN OF TREATMENT FOR OUTPATIENT REHABILITATION  (COMPLETE FOR INITIAL CLAIMS ONLY)  Patient's Last Name, First Name, M.I.  YOB: 1960  Lissette Wong                          Provider's Name  [facility] Medical Record No.  [id number]                               Onset Date:  10/27/21   Start of Care Date:  (P) 10/28/21     Type:     ___PT   _X_OT   ___SLP Medical Diagnosis:  (P) TKA                        OT Diagnosis:  decreased ADLs   Visits from SOC:  1   _________________________________________________________________________________  Plan of Treatment/Functional Goals    Planned Interventions: ADL retraining, transfer training   Goals: See Occupational Therapy Goals on Care Plan in [facility] electronic health record.    Therapy Frequency: Daily  Predicted Duration of Therapy Intervention: 1 day  _________________________________________________________________________________    I CERTIFY THE NEED FOR THESE SERVICES FURNISHED UNDER        THIS PLAN OF TREATMENT AND WHILE UNDER MY CARE     (Physician co-signature of this document indicates review and certification of the therapy plan).                Certification date from: (P) 10/28/21, Certification date to: (P) 11/28/21                 Initial Assessment        See Occupational Therapy evaluation dated (P) 10/28/21 in Epic electronic health record.

## 2021-10-28 NOTE — DISCHARGE SUMMARY
[facility] Orthopedics Discharge Summary                                  [facility]     DAWSON RODRIGUEZ [id number]   Age: 61 year old  PCP: Humberto Og, [phone] 1960     Date of Admission:  10/27/2021  Date of Discharge::  10/28/2021  Discharge Provider:  Otis De Leon NP    Code status:  Full Code    Admission Information:  Admission Diagnosis:  Knee osteoarthritis [M17.10]  Osteoarthritis, knee [M17.10]    Post-Operative Day: 1 Day Post-Op     Reason for admission:  The patient was admitted for the following:Procedure(s) (LRB):  LEFT TOTAL KNEE ARTHROPLASTY (Left)    Active Problems:    Osteoarthritis, knee      Allergies:  Morphine    Following the procedure noted above the patient was transferred to the post-op floor and started on:    Therapy:  physical therapy and occupational therapy  Anticoagulation Plan:  mg daily   Pain Management: oxycodone and tylenol  Weight bearing status: Weight bearing as tolerated     The patient was followed by Orthopedics during the inpatient treatment course:  Complications:  None. Mild Acute Blood Loss Anemia: non symptomatic    Additional consultations:  None     Pertinent Labs   Lab Results: personally reviewed.     Recent Labs   Lab Test 10/28/21  0546 10/11/21  1158 07/15/21  0604 06/24/21  1221 06/24/21  1221 04/12/21  1158 02/09/21  0825 02/07/21  1502 02/07/21  1502   INR  --   --   --   --   --   --   --   --  0.99   HGB 9.9* 13.1 9.7*   < > 13.1  --  13.0   < > 12.9   HCT  --  41.3  --   --  40.9  --  41.6  --  39.7   MCV  --  90  --   --  91  --  89  --  89   PLT  --  290  --   --  281  --  306  --  286   NA  --  138  --   --  140 142 139   < > 138   CRP  --   --   --   --   --   --  <2.9  --   --     < > = values in this interval not displayed.          Discharge Information:  Condition at discharge: Stable  Discharge destination:  Discharged to home     Medications at discharge:  Current Discharge Medication List      START taking  these medications    Details   oxyCODONE (ROXICODONE) 5 MG tablet Take 1-2 tablets (5-10 mg) by mouth every 4 hours as needed for breakthrough pain or moderate to severe pain  Qty: 32 tablet, Refills: 0    Comments: Take 1 pill for moderate pain (4-6/10) and 2 pills for severe pain (7-10/10). Alternate with extra strength tylenol.      polyethylene glycol (MIRALAX) 17 GM/Dose powder Take 17 g by mouth daily  Qty: 510 g    Comments: Take while taking narcotics medications and until bowels are back to normal routine. Hold for loose stools      senna-docusate (SENOKOT-S/PERICOLACE) 8.6-50 MG tablet Take 1 tablet by mouth 2 times daily    Comments: Take while taking narcotics medications and until bowels are back to normal routine. Hold for loose stools         CONTINUE these medications which have CHANGED    Details   aspirin (ASA) 325 MG EC tablet Take 1 tablet (325 mg) by mouth daily    Comments: Take 1 aspirin once a day to prevent blood clots. Take with food. Do not take at same time as other anti-inflammatories, like ibuprofen or celebrex.         CONTINUE these medications which have NOT CHANGED    Details   acetaminophen (TYLENOL) 500 MG tablet Take 1-2 tablets (500-1,000 mg) by mouth every 6 hours      Acidophilus Lactobacillus CAPS Take 3 capsules by mouth At Bedtime Mother Earth Brand      clobetasol (TEMOVATE) 0.05 % external cream Apply topically 2 times daily as needed      fexofenadine (ALLEGRA) 180 MG tablet Take 180 mg by mouth daily       fish oil-omega-3 fatty acids 1000 MG capsule Take 2 g by mouth daily Stopped 10/20/21      fluticasone (FLONASE) 50 MCG/ACT nasal spray Spray 1 spray into both nostrils daily      Glucosamine-Chondroitin 250-200 MG TABS Take 2 tablets by mouth daily Stopped 10/20/21      lisinopril (ZESTRIL) 10 MG tablet Take 1 tablet (10 mg) by mouth daily  Qty: 30 tablet, Refills: 0    Comments: Future refills by PCP Dr. Reba Ly with phone number None.  Associated  Diagnoses: TIA (transient ischemic attack)      Multiple Vitamins-Minerals (AIRBORNE GUMMIES PO) Take 3 chew tab by mouth daily      red yeast rice 600 MG CAPS Take 600 mg by mouth daily       vitamin C (ASCORBIC ACID) 250 MG tablet Take 250 mg by mouth daily      Vitamin D3 (CHOLECALCIFEROL) 125 MCG (5000 UT) tablet Take 125 mcg by mouth daily      TURMERIC PO Take 2 tablets by mouth daily Stopped 10/21/21                        Follow-Up Care:  Patient should be seen in the office in 10-14 days by the Orthopedic Surgeon/Physician Assistant.  Call [phone] for appointment or questions.    It was my pleasure to take care of the above patient.  Otis De Leon NP

## 2021-10-28 NOTE — PLAN OF CARE
Occupational Therapy Discharge Summary    Reason for therapy discharge:    All goals and outcomes met, no further needs identified.    Progress towards therapy goal(s). See goals on Care Plan in [facility] electronic health record for goal details.  Goals met    Therapy recommendation(s):    No further therapy is recommended.  Mary Reyes, OTR/L  10/28/2021

## 2021-10-28 NOTE — PLAN OF CARE
Patient vital signs are at baseline: Yes  Patient able to ambulate as they were prior to admission or with assist devices provided by therapies during their stay:  Yes  Patient MUST void prior to discharge:  Yes  Patient able to tolerate oral intake:  Yes  Pain has adequate pain control using Oral analgesics:  Yes    CMS intact. Dressing CDI. Up with SBA. Anticipated discharge to home after therapy. Will continue to monitor and follow plan of care.    Problem: Pain (Knee Arthroplasty)  Goal: Acceptable Pain Control  Outcome: Improving  Intervention: Prevent or Manage Pain  Recent Flowsheet Documentation  Taken 10/28/2021 0008 by Nae Mora, RN  Pain Management Interventions:   medication (see MAR)   aromatherapy   breathing exercises   care clustered   cold applied  Complementary Therapy: essential oils utilized     Problem: Joint Function Impaired (Knee Arthroplasty)  Goal: Optimal Functional Ability  Outcome: Improving  Intervention: Promote Optimal Functional Status

## 2021-10-28 NOTE — PROGRESS NOTES
[facility]      OUTPATIENT PHYSICAL THERAPY EVALUATION  PLAN OF TREATMENT FOR OUTPATIENT REHABILITATION  (COMPLETE FOR INITIAL CLAIMS ONLY)  Patient's Last Name, First Name, M.I.  YOB: 1960  Lissette Wong                        Provider's Name  [facility] Medical Record No.  [id number]                               Onset Date:  (P) 10/27/21   Start of Care Date:  (P) 10/28/21      Type:     _X_PT   ___OT   ___SLP Medical Diagnosis:  (P) OA                        PT Diagnosis:  (P) impaired functional mobility   Visits from SOC:  1   _________________________________________________________________________________  Plan of Treatment/Functional Goals    Planned Interventions: (P) gait training, home exercise program, ROM (range of motion), stair training, strengthening, transfer training     Goals: See Physical Therapy Goals on Care Plan in [facility] electronic health record.    Therapy Frequency: (P) One time eval and treatment only  Predicted Duration of Therapy Intervention: (P) 1 day  _________________________________________________________________________________    I CERTIFY THE NEED FOR THESE SERVICES FURNISHED UNDER        THIS PLAN OF TREATMENT AND WHILE UNDER MY CARE     (Physician co-signature of this document indicates review and certification of the therapy plan).                Certification date from: (P) 10/28/21, Certification date to: (P) 11/28/21    Referring Physician: (P) Kendell Grady MD            Initial Assessment        See Physical Therapy evaluation dated (P) 10/28/21 in Epic electronic health record.

## 2021-10-28 NOTE — PROGRESS NOTES
[facility] Orthopaedics Progress Note      Post-operative Day: POD 1  Procedure(s):  LEFT TOTAL KNEE ARTHROPLASTY        Plan: Anticoagulation protocol: return to your pre-op 325 ASA daily.           Pain medications:  oxycodone and tylenol            Weight bearing status:  WBAT            Disposition:  Home this morning after therapies and passing flatus            Continue cares and rehabilitation     Subjective:  Lissette reports doing well, 4 months s/p right TKA, and current 1 day s/p left TKA. Patient reports she did not use oxycodone appropriately (underutilized) after last TKA, and will do some as recommended, consistently this time.   Patient did have a TIA in February of this year. PCP recommends resuming 325 mg ASA whenever deemed appropriate.   Pain: minimal and moderate  Chest pain, SOB:  No  Denies lightheadedness/dizziness  Denies nausea/ vomiting  Passing flatus  voiding well    Objective:  Blood pressure 131/69, pulse 79, temperature 97.9  F (36.6  C), temperature source Oral, resp. rate 18, weight 95.7 kg (210 lb 14.4 oz), SpO2 93 %, unknown if currently breastfeeding.  /73 (BP Location: Left arm)   Pulse 61   Temp 97.6  F (36.4  C) (Oral)   Resp 16   Wt 95.7 kg (210 lb 14.4 oz)   SpO2 94%   BMI 36.72 kg/m        Motor function, sensation, and circulation intact   Yes  Wound status: Aquacel is clean, without shadowing, dry, and intact. Yes  Calf tenderness: Bilateral  No    Pertinent Labs   Lab Results: personally reviewed.     Recent Labs   Lab Test 10/11/21  1158 07/15/21  0604 06/24/21  1221 04/12/21  1158 02/09/21  0825 02/07/21  1502 02/07/21  1502   INR  --   --   --   --   --   --  0.99   HGB 13.1 9.7* 13.1  --  13.0   < > 12.9   HCT 41.3  --  40.9  --  41.6  --  39.7   MCV 90  --  91  --  89  --  89     --  281  --  306  --  286     --  140 142 139   < > 138   CRP  --   --   --   --  <2.9  --   --     < > = values in this interval not displayed.       Report  completed by:  Otis De Leon, NP

## 2021-11-09 ENCOUNTER — TRANSFERRED RECORDS (OUTPATIENT)
Dept: HEALTH INFORMATION MANAGEMENT | Facility: CLINIC | Age: 61
End: 2021-11-09

## 2021-11-09 ENCOUNTER — HOSPITAL ENCOUNTER (OUTPATIENT)
Dept: ULTRASOUND IMAGING | Facility: CLINIC | Age: 61
Discharge: HOME | End: 2021-11-09
Attending: PHYSICIAN ASSISTANT | Admitting: PHYSICIAN ASSISTANT
Payer: COMMERCIAL

## 2021-11-09 DIAGNOSIS — Z96.659 STATUS POST TOTAL KNEE REPLACEMENT: ICD-10-CM

## 2021-11-09 PROCEDURE — 93971 EXTREMITY STUDY: CPT | Mod: LT

## 2021-12-07 ENCOUNTER — TRANSFERRED RECORDS (OUTPATIENT)
Dept: HEALTH INFORMATION MANAGEMENT | Facility: CLINIC | Age: 61
End: 2021-12-07
Payer: COMMERCIAL

## 2022-04-29 ENCOUNTER — MYC MEDICAL ADVICE (OUTPATIENT)
Dept: INTERNAL MEDICINE | Facility: CLINIC | Age: 62
End: 2022-04-29
Payer: COMMERCIAL

## 2022-04-29 DIAGNOSIS — G45.9 TIA (TRANSIENT ISCHEMIC ATTACK): ICD-10-CM

## 2022-04-29 RX ORDER — LISINOPRIL 10 MG/1
10 TABLET ORAL DAILY
Qty: 90 TABLET | Refills: 3 | Status: SHIPPED | OUTPATIENT
Start: 2022-04-29

## 2022-05-22 ENCOUNTER — HEALTH MAINTENANCE LETTER (OUTPATIENT)
Age: 62
End: 2022-05-22

## 2022-09-25 ENCOUNTER — HEALTH MAINTENANCE LETTER (OUTPATIENT)
Age: 62
End: 2022-09-25

## 2023-05-08 ENCOUNTER — HEALTH MAINTENANCE LETTER (OUTPATIENT)
Age: 63
End: 2023-05-08

## 2023-10-14 ENCOUNTER — HEALTH MAINTENANCE LETTER (OUTPATIENT)
Age: 63
End: 2023-10-14

## 2024-05-11 ENCOUNTER — HEALTH MAINTENANCE LETTER (OUTPATIENT)
Age: 64
End: 2024-05-11

## (undated) DEVICE — HOOD FLYTE SURGICOOL

## (undated) DEVICE — SOL NACL 0.9% IRRIG 1000ML BOTTLE 2F7124

## (undated) DEVICE — SOL WATER IRRIG 1000ML BOTTLE 2F7114

## (undated) DEVICE — GLOVE BIOGEL INDICATOR 7.5 LF 41675

## (undated) DEVICE — CUSTOM PACK TOTAL KNEE ACCESSORY SOP5BTAHEA

## (undated) DEVICE — SU STRATAFIX MONOCRYL 3-0 SPIRAL PS-2 30CM SXMP1B106

## (undated) DEVICE — GLOVE BIOGEL PI SZ 8.0 40880

## (undated) DEVICE — SUCTION MANIFOLD NEPTUNE 2 SYS 4 PORT 0702-020-000

## (undated) DEVICE — GLOVE BIOGEL PI ULTRATOUCH G SZ 7.5 42175

## (undated) DEVICE — DECANTER VIAL 2006S

## (undated) DEVICE — DRSG STERI STRIP 1X5" R1548

## (undated) DEVICE — SOL NACL 0.9% INJ 1000ML BAG 2B1324X

## (undated) DEVICE — DRSG AQUACEL AG 3.5X9.75" HYDROFIBER 412011

## (undated) DEVICE — GLOVE BIOGEL PI INDICATOR 8.0 LF 41680

## (undated) DEVICE — DRSG AQUACEL AG 3.5X12" HYDROFIBER 420670

## (undated) DEVICE — SUTURE MONOCRYL+ 2-0 27IN SH UND MCP417H

## (undated) DEVICE — CUSTOM PACK TOTAL KNEE SOP5BTKHEC

## (undated) DEVICE — SUTURE VICRYL+ 1 18 CT/CR  VLT VCP753D

## (undated) DEVICE — BANDAGE ELASTIC 6X550 LF DBL 593-96LF

## (undated) DEVICE — HOOD FLYTE SURGICOOL WITH PEEL AWAY

## (undated) DEVICE — PLATE GROUNDING ADULT W/CORD 9165L

## (undated) DEVICE — BLADE SAW SAGITTAL STRK DUAL CUT 4118-135-090

## (undated) DEVICE — HOLDER LIMB VELCRO OR 0814-1533

## (undated) DEVICE — TOURNIQUET SGL  BLADDER 30"X4" BLUE 5921030135

## (undated) DEVICE — DRSG AQUACEL AG HYDROFIBER  3.5X10" 422605